# Patient Record
Sex: FEMALE | Race: BLACK OR AFRICAN AMERICAN | NOT HISPANIC OR LATINO
[De-identification: names, ages, dates, MRNs, and addresses within clinical notes are randomized per-mention and may not be internally consistent; named-entity substitution may affect disease eponyms.]

---

## 2018-08-13 ENCOUNTER — RESULT REVIEW (OUTPATIENT)
Age: 38
End: 2018-08-13

## 2019-01-10 PROBLEM — Z00.00 ENCOUNTER FOR PREVENTIVE HEALTH EXAMINATION: Status: ACTIVE | Noted: 2019-01-10

## 2019-01-18 ENCOUNTER — RESULT REVIEW (OUTPATIENT)
Age: 39
End: 2019-01-18

## 2019-01-18 ENCOUNTER — APPOINTMENT (OUTPATIENT)
Dept: MAMMOGRAPHY | Facility: CLINIC | Age: 39
End: 2019-01-18

## 2019-01-18 ENCOUNTER — APPOINTMENT (OUTPATIENT)
Dept: ULTRASOUND IMAGING | Facility: IMAGING CENTER | Age: 39
End: 2019-01-18
Payer: COMMERCIAL

## 2019-01-18 ENCOUNTER — OUTPATIENT (OUTPATIENT)
Dept: OUTPATIENT SERVICES | Facility: HOSPITAL | Age: 39
LOS: 1 days | End: 2019-01-18
Payer: COMMERCIAL

## 2019-01-18 ENCOUNTER — APPOINTMENT (OUTPATIENT)
Dept: MAMMOGRAPHY | Facility: IMAGING CENTER | Age: 39
End: 2019-01-18
Payer: COMMERCIAL

## 2019-01-18 DIAGNOSIS — R92.8 OTHER ABNORMAL AND INCONCLUSIVE FINDINGS ON DIAGNOSTIC IMAGING OF BREAST: ICD-10-CM

## 2019-01-18 PROCEDURE — G0279: CPT

## 2019-01-18 PROCEDURE — 19083 BX BREAST 1ST LESION US IMAG: CPT | Mod: LT

## 2019-01-18 PROCEDURE — 19082 BX BREAST ADD LESION STRTCTC: CPT | Mod: LT

## 2019-01-18 PROCEDURE — 19084 BX BREAST ADD LESION US IMAG: CPT

## 2019-01-18 PROCEDURE — 19082 BX BREAST ADD LESION STRTCTC: CPT

## 2019-01-18 PROCEDURE — 88360 TUMOR IMMUNOHISTOCHEM/MANUAL: CPT | Mod: 26

## 2019-01-18 PROCEDURE — 19081 BX BREAST 1ST LESION STRTCTC: CPT | Mod: LT

## 2019-01-18 PROCEDURE — 77065 DX MAMMO INCL CAD UNI: CPT | Mod: 26,RT

## 2019-01-18 PROCEDURE — 88305 TISSUE EXAM BY PATHOLOGIST: CPT | Mod: 26

## 2019-01-18 PROCEDURE — 19081 BX BREAST 1ST LESION STRTCTC: CPT

## 2019-01-18 PROCEDURE — 77065 DX MAMMO INCL CAD UNI: CPT | Mod: 26,LT

## 2019-01-18 PROCEDURE — 76642 ULTRASOUND BREAST LIMITED: CPT

## 2019-01-18 PROCEDURE — 88305 TISSUE EXAM BY PATHOLOGIST: CPT

## 2019-01-18 PROCEDURE — 77065 DX MAMMO INCL CAD UNI: CPT

## 2019-01-18 PROCEDURE — 76642 ULTRASOUND BREAST LIMITED: CPT | Mod: 26,RT

## 2019-01-18 PROCEDURE — 19084 BX BREAST ADD LESION US IMAG: CPT | Mod: LT

## 2019-01-18 PROCEDURE — A4648: CPT

## 2019-01-18 PROCEDURE — 88360 TUMOR IMMUNOHISTOCHEM/MANUAL: CPT

## 2019-01-18 PROCEDURE — G0279: CPT | Mod: 26

## 2019-01-18 PROCEDURE — 19083 BX BREAST 1ST LESION US IMAG: CPT

## 2019-01-31 ENCOUNTER — APPOINTMENT (OUTPATIENT)
Dept: SURGICAL ONCOLOGY | Facility: CLINIC | Age: 39
End: 2019-01-31
Payer: COMMERCIAL

## 2019-01-31 ENCOUNTER — TRANSCRIPTION ENCOUNTER (OUTPATIENT)
Age: 39
End: 2019-01-31

## 2019-01-31 VITALS
BODY MASS INDEX: 35.87 KG/M2 | HEART RATE: 82 BPM | OXYGEN SATURATION: 100 % | WEIGHT: 190 LBS | RESPIRATION RATE: 16 BRPM | HEIGHT: 61 IN | DIASTOLIC BLOOD PRESSURE: 72 MMHG | SYSTOLIC BLOOD PRESSURE: 119 MMHG

## 2019-01-31 DIAGNOSIS — Z78.9 OTHER SPECIFIED HEALTH STATUS: ICD-10-CM

## 2019-01-31 DIAGNOSIS — Z86.69 PERSONAL HISTORY OF OTHER DISEASES OF THE NERVOUS SYSTEM AND SENSE ORGANS: ICD-10-CM

## 2019-01-31 PROCEDURE — 99205 OFFICE O/P NEW HI 60 MIN: CPT

## 2019-01-31 PROCEDURE — 99000 SPECIMEN HANDLING OFFICE-LAB: CPT

## 2019-01-31 NOTE — CONSULT LETTER
[Dear  ___] : Dear  [unfilled], [Consult Letter:] : I had the pleasure of evaluating your patient, [unfilled]. [Please see my note below.] : Please see my note below. [Consult Closing:] : Thank you very much for allowing me to participate in the care of this patient.  If you have any questions, please do not hesitate to contact me. [Sincerely,] : Sincerely, [FreeTextEntry2] : Katya Platt MD [FreeTextEntry3] : Pranay Saenz MD \par (O) [phone]\par (F) [phone]

## 2019-01-31 NOTE — REASON FOR VISIT
[Initial Consultation] : an initial consultation for [Breast Cancer] : breast cancer [Other: _____] : [unfilled]

## 2019-01-31 NOTE — HISTORY OF PRESENT ILLNESS
[de-identified] : Ms. Altman is a 38 year old female who presents today for an initial consultation.  She  was referred by Dr. Katya Platt. \par \par Approximately 1 year ago the patient palpated a small lump in her Left outer breast.  She continued to monitor the area for change in size.  When she was seen by her physician for her annual exam she brought it to her attention and her physician was also able to palpate the questionable area.  In Aug 2018 she was sent for a bilateral mammo at which time she was noted with bilateral breast nodules.  She was noted with 3 nodules in the Left breast the largest in the upper posterior breast measuring approximately 3 cm.  The 2 smaller nodules were in the outer posterior breast measuring 1cm.  She was also noted with microcalcifications spanning approximately 13cm.  She underwent biopsies of Left breast 7:00, 5:00 and retroareolar region with the following results:\par \par Final pathology revealed:\par Retroareolar - DCIS (Er+/Pr+)\par 7:00 - DCIS (Er+/Pr+)\par 5:00 -  invasive moderately differentiated ductal carcinoma measuring at least 0.5cm (Er+/Pr+/Her2-)\par Left axilla - reactive LN\par \par On 19 she underwent a targeted Right breast mammo and was noted with a 3.3cm mass with internal calcifications in the Right posterior breast which was indeterminate and biopsy was recommended.  She is scheduled for this biopsy next week.\par Denies discharge or nipple inversion.\par \par PMH otherwise significant for hydrocephalus s/p shunt placement Aug 1990.\par Denies family history of breast or ovarian cancer:\par \par Menarche: 11 y.o.\par \par Age at first pregnancy: 18 y.o.\par \par Internist: Dr. Katya Platt

## 2019-01-31 NOTE — PHYSICAL EXAM
[Normal] : supple, no neck mass and thyroid not enlarged [Normal Supraclavicular Lymph Nodes] : normal supraclavicular lymph nodes [Normal Axillary Lymph Nodes] : normal axillary lymph nodes [Normal] : oriented to person, place and time, with appropriate affect [FreeTextEntry1] : RC present for exam.  [de-identified] : Normal S1, S2. Regular rate and rhythm. [de-identified] : Complete breast exam performed in supine and upright positions. Palpable superficial mass Left breast 3-4:00 without tenderness, nipple discharge, inversion, deviation or enlarged axillary or supraclavicular lymph nodes.  [de-identified] : Clear breath sounds bilaterally, normal respiratory effort.

## 2019-01-31 NOTE — ASSESSMENT
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 I explained that breast cancer can be treated with 2 main surgical approaches. One is breast conserving therapy. The other is mastectomy with or without immediate reconstruction by a plastic surgeon. Breast conserving therapy involves wide excision of the involved area. Negative margins must be achieved with this wide excision. In addition, evaluation of the lymph nodes either with a sentinel lymph node biopsy or an axillary lymph node dissection may be required. This treatment usually requires postoperative radiation to the breast. Treatment with mastectomy also involves node dissection. Postoperative radiation therapy may be needed even after mastectomy in certain advanced cases. \par \par At this time I am sending the patient for a bilateral beast MRI to evaluate for extent of disease, contralateral disease or suspicious lymph nodes. \par Being that she has no family history of breast or ovarian cancer and her  extent of Right breast cancer in office genetic testing will also be conducted today.  Will await results.\par \par Once results are obtained will discuss final surgical plan with patient however if testing negative and if patient wants breast conserving therapy can consider lumpectomy with bilateral reduction with plastics.

## 2019-02-06 ENCOUNTER — OUTPATIENT (OUTPATIENT)
Dept: OUTPATIENT SERVICES | Facility: HOSPITAL | Age: 39
LOS: 1 days | End: 2019-02-06
Payer: COMMERCIAL

## 2019-02-06 ENCOUNTER — APPOINTMENT (OUTPATIENT)
Dept: MRI IMAGING | Facility: IMAGING CENTER | Age: 39
End: 2019-02-06
Payer: COMMERCIAL

## 2019-02-06 DIAGNOSIS — D05.12 INTRADUCTAL CARCINOMA IN SITU OF LEFT BREAST: ICD-10-CM

## 2019-02-06 DIAGNOSIS — C50.912 MALIGNANT NEOPLASM OF UNSPECIFIED SITE OF LEFT FEMALE BREAST: ICD-10-CM

## 2019-02-06 PROCEDURE — 77049 MRI BREAST C-+ W/CAD BI: CPT | Mod: 26

## 2019-02-06 PROCEDURE — 70250 X-RAY EXAM OF SKULL: CPT

## 2019-02-06 PROCEDURE — C8937: CPT

## 2019-02-06 PROCEDURE — 70250 X-RAY EXAM OF SKULL: CPT | Mod: 26

## 2019-02-06 PROCEDURE — C8908: CPT

## 2019-02-06 PROCEDURE — A9585: CPT

## 2019-02-07 ENCOUNTER — APPOINTMENT (OUTPATIENT)
Dept: MAMMOGRAPHY | Facility: IMAGING CENTER | Age: 39
End: 2019-02-07
Payer: COMMERCIAL

## 2019-02-07 ENCOUNTER — OUTPATIENT (OUTPATIENT)
Dept: OUTPATIENT SERVICES | Facility: HOSPITAL | Age: 39
LOS: 1 days | End: 2019-02-07
Payer: COMMERCIAL

## 2019-02-07 ENCOUNTER — RESULT REVIEW (OUTPATIENT)
Age: 39
End: 2019-02-07

## 2019-02-07 DIAGNOSIS — Z00.8 ENCOUNTER FOR OTHER GENERAL EXAMINATION: ICD-10-CM

## 2019-02-07 PROCEDURE — 77065 DX MAMMO INCL CAD UNI: CPT

## 2019-02-07 PROCEDURE — 19081 BX BREAST 1ST LESION STRTCTC: CPT | Mod: RT

## 2019-02-07 PROCEDURE — A4648: CPT

## 2019-02-07 PROCEDURE — 88305 TISSUE EXAM BY PATHOLOGIST: CPT | Mod: 26

## 2019-02-07 PROCEDURE — 19081 BX BREAST 1ST LESION STRTCTC: CPT

## 2019-02-07 PROCEDURE — 88305 TISSUE EXAM BY PATHOLOGIST: CPT

## 2019-02-07 PROCEDURE — 77065 DX MAMMO INCL CAD UNI: CPT | Mod: 26,RT

## 2019-02-14 ENCOUNTER — APPOINTMENT (OUTPATIENT)
Dept: PLASTIC SURGERY | Facility: CLINIC | Age: 39
End: 2019-02-14
Payer: COMMERCIAL

## 2019-02-14 VITALS — HEIGHT: 61 IN | BODY MASS INDEX: 36.82 KG/M2 | WEIGHT: 195 LBS

## 2019-02-14 DIAGNOSIS — Z78.9 OTHER SPECIFIED HEALTH STATUS: ICD-10-CM

## 2019-02-14 PROCEDURE — 99205 OFFICE O/P NEW HI 60 MIN: CPT

## 2019-02-14 NOTE — REVIEW OF SYSTEMS
[Negative] : Heme/Lymph [Fever] : no fever [Chills] : no chills [Chest Pain] : no chest pain [Palpitations] : no palpitations [Shortness Of Breath] : no shortness of breath [Cough] : no cough [Abdominal Pain] : no abdominal pain [Vomiting] : no vomiting [Dysuria] : no dysuria

## 2019-02-14 NOTE — REASON FOR VISIT
[Consultation] : a consultation visit [Other: _____] : [unfilled] [FreeTextEntry1] : patient presents for breast reconstruction consultation.
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
## 2019-02-14 NOTE — PHYSICAL EXAM
[Bra Size: _______] : Bra Size: [unfilled] [NI] : Normal [de-identified] : sternal notch to nipple on the left is 36 cm and 35 cm on the right, the nipple to IMF is 19.5 cm on the left and 21 cm on the right, the base width is 14 cm, and there is grade 3 ptosis.  The left breast is lower than the right breast, the right breast is larger than the left breast. [de-identified] : 6 cm supraumbilical scar, 11 cm vertical infraumbilical scar, there is adequate adiposity for free flap tissue transfer

## 2019-02-21 ENCOUNTER — FORM ENCOUNTER (OUTPATIENT)
Age: 39
End: 2019-02-21

## 2019-02-22 ENCOUNTER — OUTPATIENT (OUTPATIENT)
Dept: OUTPATIENT SERVICES | Facility: HOSPITAL | Age: 39
LOS: 1 days | End: 2019-02-22
Payer: COMMERCIAL

## 2019-02-22 ENCOUNTER — APPOINTMENT (OUTPATIENT)
Age: 39
End: 2019-02-22
Payer: COMMERCIAL

## 2019-02-22 DIAGNOSIS — Z42.1 ENCOUNTER FOR BREAST RECONSTRUCTION FOLLOWING MASTECTOMY: ICD-10-CM

## 2019-02-22 PROCEDURE — 72198 MR ANGIO PELVIS W/O & W/DYE: CPT | Mod: 26

## 2019-02-22 PROCEDURE — 74185 MRA ABD W OR W/O CNTRST: CPT | Mod: 26

## 2019-02-22 PROCEDURE — C8920: CPT

## 2019-02-22 PROCEDURE — C8900: CPT

## 2019-02-25 ENCOUNTER — APPOINTMENT (OUTPATIENT)
Dept: SURGICAL ONCOLOGY | Facility: CLINIC | Age: 39
End: 2019-02-25
Payer: COMMERCIAL

## 2019-02-25 PROCEDURE — 99213 OFFICE O/P EST LOW 20 MIN: CPT

## 2019-02-25 NOTE — ASSESSMENT
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 I explained that breast cancer can be treated with 2 main surgical approaches. One is breast conserving therapy. The other is mastectomy with or without immediate reconstruction by a plastic surgeon. Breast conserving therapy involves wide excision of the involved area. Negative margins must be achieved with this wide excision. In addition, evaluation of the lymph nodes either with a sentinel lymph node biopsy or an axillary lymph node dissection may be required. This treatment usually requires postoperative radiation to the breast. Treatment with mastectomy also involves node dissection. Postoperative radiation therapy may be needed even after mastectomy in certain advanced c. has\par \par \par Once results are obtained will discuss final surgical plan with patient however if testing negative and if patient wants breast conserving therapy can consider lumpectomy with bilateral reduction with plastics.  \par \par Pt desires bilat. mast with immediate recon.

## 2019-02-25 NOTE — HISTORY OF PRESENT ILLNESS
[de-identified] : Ms. Altman is a 38 year old female who presents today for pre-surgical discussion. \par \par Approximately 1 year ago the patient palpated a small lump in her Left outer breast.  She continued to monitor the area for change in size.  When she was seen by her physician for her annual exam she brought it to her attention and her physician was also able to palpate the questionable area.  In Aug 2018 she was sent for a bilateral mammo at which time she was noted with bilateral breast nodules.  She was noted with 3 nodules in the Left breast the largest in the upper posterior breast measuring approximately 3 cm.  The 2 smaller nodules were in the outer posterior breast measuring 1cm.  She was also noted with microcalcifications spanning approximately 13cm.  She underwent biopsies of Left breast 7:00, 5:00 and retroareolar region with the following results:\par \par Final pathology revealed:\par Retroareolar - DCIS (Er+/Pr+)\par 7:00 - DCIS (Er+/Pr+)\par 5:00 -  invasive moderately differentiated ductal carcinoma measuring at least 0.5cm (Er+/Pr+/Her2-)\par Left axilla - reactive LN\par \par On 19 she underwent a targeted Right breast mammo and was noted with a 3.3cm mass with internal calcifications in the Right posterior breast which was indeterminate and biopsy was recommended.  Final path was benign.\par \par She has since consulted with Dr. aCn (plastics) regarding reconstruction options.\par \par PMH otherwise significant for hydrocephalus s/p shunt placement Aug 1990.\par Denies family history of breast or ovarian cancer:\par \par Menarche: 11 y.o.\par \par Age at first pregnancy: 18 y.o.\par \par Internist: Dr. Katya Platt

## 2019-03-06 ENCOUNTER — OUTPATIENT (OUTPATIENT)
Dept: OUTPATIENT SERVICES | Facility: HOSPITAL | Age: 39
LOS: 1 days | End: 2019-03-06

## 2019-03-06 VITALS
OXYGEN SATURATION: 98 % | WEIGHT: 203.93 LBS | HEIGHT: 60.25 IN | TEMPERATURE: 99 F | HEART RATE: 93 BPM | RESPIRATION RATE: 14 BRPM | SYSTOLIC BLOOD PRESSURE: 130 MMHG | DIASTOLIC BLOOD PRESSURE: 80 MMHG

## 2019-03-06 DIAGNOSIS — Z98.890 OTHER SPECIFIED POSTPROCEDURAL STATES: Chronic | ICD-10-CM

## 2019-03-06 DIAGNOSIS — Z98.2 PRESENCE OF CEREBROSPINAL FLUID DRAINAGE DEVICE: Chronic | ICD-10-CM

## 2019-03-06 DIAGNOSIS — Z42.1 ENCOUNTER FOR BREAST RECONSTRUCTION FOLLOWING MASTECTOMY: ICD-10-CM

## 2019-03-06 DIAGNOSIS — D05.12 INTRADUCTAL CARCINOMA IN SITU OF LEFT BREAST: ICD-10-CM

## 2019-03-06 LAB
ANION GAP SERPL CALC-SCNC: 8 MMO/L — SIGNIFICANT CHANGE UP (ref 7–14)
BLD GP AB SCN SERPL QL: NEGATIVE — SIGNIFICANT CHANGE UP
BUN SERPL-MCNC: 11 MG/DL — SIGNIFICANT CHANGE UP (ref 7–23)
CALCIUM SERPL-MCNC: 9.1 MG/DL — SIGNIFICANT CHANGE UP (ref 8.4–10.5)
CHLORIDE SERPL-SCNC: 102 MMOL/L — SIGNIFICANT CHANGE UP (ref 98–107)
CO2 SERPL-SCNC: 28 MMOL/L — SIGNIFICANT CHANGE UP (ref 22–31)
CREAT SERPL-MCNC: 0.76 MG/DL — SIGNIFICANT CHANGE UP (ref 0.5–1.3)
GLUCOSE SERPL-MCNC: 86 MG/DL — SIGNIFICANT CHANGE UP (ref 70–99)
HCT VFR BLD CALC: 37.8 % — SIGNIFICANT CHANGE UP (ref 34.5–45)
HGB BLD-MCNC: 11.8 G/DL — SIGNIFICANT CHANGE UP (ref 11.5–15.5)
MCHC RBC-ENTMCNC: 29.1 PG — SIGNIFICANT CHANGE UP (ref 27–34)
MCHC RBC-ENTMCNC: 31.2 % — LOW (ref 32–36)
MCV RBC AUTO: 93.3 FL — SIGNIFICANT CHANGE UP (ref 80–100)
NRBC # FLD: 0 K/UL — LOW (ref 25–125)
PLATELET # BLD AUTO: 228 K/UL — SIGNIFICANT CHANGE UP (ref 150–400)
PMV BLD: 13.3 FL — HIGH (ref 7–13)
POTASSIUM SERPL-MCNC: 4.4 MMOL/L — SIGNIFICANT CHANGE UP (ref 3.5–5.3)
POTASSIUM SERPL-SCNC: 4.4 MMOL/L — SIGNIFICANT CHANGE UP (ref 3.5–5.3)
RBC # BLD: 4.05 M/UL — SIGNIFICANT CHANGE UP (ref 3.8–5.2)
RBC # FLD: 12.8 % — SIGNIFICANT CHANGE UP (ref 10.3–14.5)
RH IG SCN BLD-IMP: POSITIVE — SIGNIFICANT CHANGE UP
SODIUM SERPL-SCNC: 138 MMOL/L — SIGNIFICANT CHANGE UP (ref 135–145)
WBC # BLD: 5.01 K/UL — SIGNIFICANT CHANGE UP (ref 3.8–10.5)
WBC # FLD AUTO: 5.01 K/UL — SIGNIFICANT CHANGE UP (ref 3.8–10.5)

## 2019-03-06 RX ORDER — SODIUM CHLORIDE 9 MG/ML
1000 INJECTION, SOLUTION INTRAVENOUS
Qty: 0 | Refills: 0 | Status: DISCONTINUED | OUTPATIENT
Start: 2019-03-20 | End: 2019-03-20

## 2019-03-06 NOTE — H&P PST ADULT - NEGATIVE CARDIOVASCULAR SYMPTOMS
no chest pain/no claudication/no palpitations/no orthopnea/no peripheral edema/no paroxysmal nocturnal dyspnea/no dyspnea on exertion
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
## 2019-03-06 NOTE — H&P PST ADULT - PROBLEM SELECTOR PLAN 1
Scheduled for Bilateral total mastectomy, bilateral sentinel lymph node biopsy , possible dissection, bilateral breast reconstruction with Deep Inferior Epigastric artery  flap on 03/20/19. Pre op instructions, famotidine, chlorhexidine gluconate soap given and explained. Pt verbalized understanding.

## 2019-03-06 NOTE — H&P PST ADULT - RS GEN PE MLT RESP DETAILS PC
no rhonchi/clear to auscultation bilaterally/good air movement/no intercostal retractions/respirations non-labored/airway patent/breath sounds equal/no chest wall tenderness/no wheezes/no rales/no subcutaneous emphysema

## 2019-03-06 NOTE — H&P PST ADULT - HISTORY OF PRESENT ILLNESS
39 y/o Black female felt a lump on left breast Spring 2018, S/P US/ mammo 08/2018. Eval by GYN S/P biopsy of left breast 01/18/2019. Pt was then referred to surgeon by GYN. 37 y/o Black female presents to PST for pre op evaluation stated that she felt a lump on left breast Spring 2018, S/P US/ mammo 08/2018. Eval by GYN S/P biopsy of left breast 01/18/2019. Pt was then referred to surgeon by GYN. Pre op diagnosis: Intraductal carcinoma in situ of left breast. Now scheduled for bilateral total mastectomy, bilateral sentinel lymph node biopsy, possible dissection, bilateral breast reconstruction with Deep Inferior Epigastric Artery  flap on 03/20/19

## 2019-03-06 NOTE — H&P PST ADULT - GASTROINTESTINAL DETAILS
nontender/no distention/bowel sounds normal/no rebound tenderness/no bruit/no guarding/soft/no masses palpable

## 2019-03-13 ENCOUNTER — RX RENEWAL (OUTPATIENT)
Age: 39
End: 2019-03-13

## 2019-03-14 PROBLEM — E66.9 OBESITY, UNSPECIFIED: Chronic | Status: ACTIVE | Noted: 2019-03-06

## 2019-03-14 PROBLEM — D05.12 INTRADUCTAL CARCINOMA IN SITU OF LEFT BREAST: Chronic | Status: ACTIVE | Noted: 2019-03-06

## 2019-03-14 PROBLEM — S09.90XA UNSPECIFIED INJURY OF HEAD, INITIAL ENCOUNTER: Chronic | Status: ACTIVE | Noted: 2019-03-06

## 2019-03-14 PROBLEM — C50.912 MALIGNANT NEOPLASM OF UNSPECIFIED SITE OF LEFT FEMALE BREAST: Chronic | Status: ACTIVE | Noted: 2019-03-06

## 2019-03-19 ENCOUNTER — TRANSCRIPTION ENCOUNTER (OUTPATIENT)
Age: 39
End: 2019-03-19
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
## 2019-03-20 ENCOUNTER — APPOINTMENT (OUTPATIENT)
Dept: NUCLEAR MEDICINE | Facility: HOSPITAL | Age: 39
End: 2019-03-20

## 2019-03-20 ENCOUNTER — INPATIENT (INPATIENT)
Facility: HOSPITAL | Age: 39
LOS: 3 days | Discharge: HOME HEALTH | End: 2019-03-24
Attending: PLASTIC SURGERY | Admitting: PLASTIC SURGERY
Payer: COMMERCIAL

## 2019-03-20 ENCOUNTER — RESULT REVIEW (OUTPATIENT)
Age: 39
End: 2019-03-20

## 2019-03-20 ENCOUNTER — APPOINTMENT (OUTPATIENT)
Dept: SURGICAL ONCOLOGY | Facility: HOSPITAL | Age: 39
End: 2019-03-20

## 2019-03-20 VITALS
WEIGHT: 203.93 LBS | HEART RATE: 72 BPM | OXYGEN SATURATION: 99 % | HEIGHT: 60.25 IN | DIASTOLIC BLOOD PRESSURE: 42 MMHG | TEMPERATURE: 98 F | SYSTOLIC BLOOD PRESSURE: 110 MMHG | RESPIRATION RATE: 16 BRPM

## 2019-03-20 DIAGNOSIS — Z42.1 ENCOUNTER FOR BREAST RECONSTRUCTION FOLLOWING MASTECTOMY: ICD-10-CM

## 2019-03-20 DIAGNOSIS — Z98.2 PRESENCE OF CEREBROSPINAL FLUID DRAINAGE DEVICE: Chronic | ICD-10-CM

## 2019-03-20 DIAGNOSIS — Z98.890 OTHER SPECIFIED POSTPROCEDURAL STATES: Chronic | ICD-10-CM

## 2019-03-20 LAB
HCG UR QL: NEGATIVE — SIGNIFICANT CHANGE UP
RH IG SCN BLD-IMP: POSITIVE — SIGNIFICANT CHANGE UP

## 2019-03-20 PROCEDURE — S2068: CPT | Mod: 62,LT

## 2019-03-20 PROCEDURE — 38530 BIOPSY/REMOVAL LYMPH NODES: CPT | Mod: LT

## 2019-03-20 PROCEDURE — 38525 BIOPSY/REMOVAL LYMPH NODES: CPT | Mod: 50

## 2019-03-20 PROCEDURE — 88305 TISSUE EXAM BY PATHOLOGIST: CPT | Mod: 26

## 2019-03-20 PROCEDURE — 15877 SUCTION LIPECTOMY TRUNK: CPT

## 2019-03-20 PROCEDURE — 19303 MAST SIMPLE COMPLETE: CPT | Mod: 50

## 2019-03-20 PROCEDURE — 38790 INJECT FOR LYMPHATIC X-RAY: CPT | Mod: 50

## 2019-03-20 PROCEDURE — 88307 TISSUE EXAM BY PATHOLOGIST: CPT | Mod: 26

## 2019-03-20 PROCEDURE — S2068: CPT | Mod: RT,62

## 2019-03-20 PROCEDURE — 88332 PATH CONSLTJ SURG EA ADD BLK: CPT | Mod: 26

## 2019-03-20 PROCEDURE — 88331 PATH CONSLTJ SURG 1 BLK 1SPC: CPT | Mod: 26

## 2019-03-20 PROCEDURE — 38530 BIOPSY/REMOVAL LYMPH NODES: CPT | Mod: RT

## 2019-03-20 RX ORDER — ONDANSETRON 8 MG/1
4 TABLET, FILM COATED ORAL EVERY 8 HOURS
Qty: 0 | Refills: 0 | Status: DISCONTINUED | OUTPATIENT
Start: 2019-03-20 | End: 2019-03-24

## 2019-03-20 RX ORDER — CEFAZOLIN SODIUM 1 G
2000 VIAL (EA) INJECTION EVERY 8 HOURS
Qty: 0 | Refills: 0 | Status: COMPLETED | OUTPATIENT
Start: 2019-03-20 | End: 2019-03-23

## 2019-03-20 RX ORDER — HEPARIN SODIUM 5000 [USP'U]/ML
5000 INJECTION INTRAVENOUS; SUBCUTANEOUS EVERY 8 HOURS
Qty: 0 | Refills: 0 | Status: DISCONTINUED | OUTPATIENT
Start: 2019-03-20 | End: 2019-03-24

## 2019-03-20 RX ORDER — ACETAMINOPHEN 500 MG
1000 TABLET ORAL ONCE
Qty: 0 | Refills: 0 | Status: COMPLETED | OUTPATIENT
Start: 2019-03-20 | End: 2019-03-20

## 2019-03-20 RX ORDER — SODIUM CHLORIDE 9 MG/ML
1000 INJECTION, SOLUTION INTRAVENOUS
Qty: 0 | Refills: 0 | Status: DISCONTINUED | OUTPATIENT
Start: 2019-03-20 | End: 2019-03-22

## 2019-03-20 RX ORDER — CEFAZOLIN SODIUM 1 G
2000 VIAL (EA) INJECTION EVERY 8 HOURS
Qty: 0 | Refills: 0 | Status: DISCONTINUED | OUTPATIENT
Start: 2019-03-20 | End: 2019-03-20

## 2019-03-20 RX ORDER — OXYCODONE HYDROCHLORIDE 5 MG/1
5 TABLET ORAL EVERY 8 HOURS
Qty: 0 | Refills: 0 | Status: DISCONTINUED | OUTPATIENT
Start: 2019-03-20 | End: 2019-03-22

## 2019-03-20 RX ORDER — OXYCODONE HYDROCHLORIDE 5 MG/1
10 TABLET ORAL EVERY 6 HOURS
Qty: 0 | Refills: 0 | Status: DISCONTINUED | OUTPATIENT
Start: 2019-03-20 | End: 2019-03-24

## 2019-03-20 RX ORDER — ACETAMINOPHEN 500 MG
1000 TABLET ORAL ONCE
Qty: 0 | Refills: 0 | Status: COMPLETED | OUTPATIENT
Start: 2019-03-21 | End: 2019-03-21

## 2019-03-20 RX ORDER — KETOROLAC TROMETHAMINE 30 MG/ML
15 SYRINGE (ML) INJECTION EVERY 6 HOURS
Qty: 0 | Refills: 0 | Status: DISCONTINUED | OUTPATIENT
Start: 2019-03-20 | End: 2019-03-21

## 2019-03-20 RX ADMIN — Medication 100 MILLIGRAM(S): at 22:10

## 2019-03-20 RX ADMIN — HEPARIN SODIUM 5000 UNIT(S): 5000 INJECTION INTRAVENOUS; SUBCUTANEOUS at 22:10

## 2019-03-20 RX ADMIN — SODIUM CHLORIDE 125 MILLILITER(S): 9 INJECTION, SOLUTION INTRAVENOUS at 19:00

## 2019-03-20 RX ADMIN — Medication 400 MILLIGRAM(S): at 23:49

## 2019-03-20 NOTE — PROGRESS NOTE ADULT - ASSESSMENT
37 y/o Black female presents to PST for pre op evaluation stated that she felt a lump on left breast Spring 2018, S/P US/ mammo 08/2018. Eval by GYN S/P biopsy of left breast 01/18/2019. Pt was then referred to surgeon by GYN. Pre op diagnosis: Intraductal carcinoma in situ of left breast. Now s/p bilateral mastectomy with sentinal lymph node biopsy on 3/20.    Plan  - Pain control: IV Tylenol, Tyoradol 15mg IV push q6h, Oxy IR 5mg PO q8h PRN, Oxy IR 10mg PO q6h PRN  - NPO except meds w/ IV fluids  - Monitor VETO drain outputs  - Monitor vioptics  - Monitor surigical sites for signs of bleeding or hematoma  - DVT ppx: subQ heparin

## 2019-03-20 NOTE — BRIEF OPERATIVE NOTE - OPERATION/FINDINGS
Technetium radiotracer injected into both breasts prior to procedure. Left mastectomy performed first, sentinel lymph node identified with gamma probe and sent for intraoperative frozen, negative. Right mastectomy performed next, with sentinel lymph node sent for frozen, negative.     Reconstruction per Plastics.

## 2019-03-20 NOTE — BRIEF OPERATIVE NOTE - NSICDXBRIEFPROCEDURE_GEN_ALL_CORE_FT
PROCEDURES:  Mastectomy, bilateral, with sentinel lymph node biopsy 20-Mar-2019 11:08:25  Niko Rader

## 2019-03-21 ENCOUNTER — TRANSCRIPTION ENCOUNTER (OUTPATIENT)
Age: 39
End: 2019-03-21

## 2019-03-21 LAB
ANION GAP SERPL CALC-SCNC: 10 MMO/L — SIGNIFICANT CHANGE UP (ref 7–14)
BUN SERPL-MCNC: 9 MG/DL — SIGNIFICANT CHANGE UP (ref 7–23)
CALCIUM SERPL-MCNC: 8.5 MG/DL — SIGNIFICANT CHANGE UP (ref 8.4–10.5)
CHLORIDE SERPL-SCNC: 104 MMOL/L — SIGNIFICANT CHANGE UP (ref 98–107)
CO2 SERPL-SCNC: 23 MMOL/L — SIGNIFICANT CHANGE UP (ref 22–31)
CREAT SERPL-MCNC: 0.88 MG/DL — SIGNIFICANT CHANGE UP (ref 0.5–1.3)
GLUCOSE SERPL-MCNC: 136 MG/DL — HIGH (ref 70–99)
HCT VFR BLD CALC: 18.6 % — CRITICAL LOW (ref 34.5–45)
HCT VFR BLD CALC: 30.7 % — LOW (ref 34.5–45)
HGB BLD-MCNC: 10 G/DL — LOW (ref 11.5–15.5)
HGB BLD-MCNC: 6.1 G/DL — CRITICAL LOW (ref 11.5–15.5)
MCHC RBC-ENTMCNC: 29.8 PG — SIGNIFICANT CHANGE UP (ref 27–34)
MCHC RBC-ENTMCNC: 29.8 PG — SIGNIFICANT CHANGE UP (ref 27–34)
MCHC RBC-ENTMCNC: 32.6 % — SIGNIFICANT CHANGE UP (ref 32–36)
MCHC RBC-ENTMCNC: 32.8 % — SIGNIFICANT CHANGE UP (ref 32–36)
MCV RBC AUTO: 90.7 FL — SIGNIFICANT CHANGE UP (ref 80–100)
MCV RBC AUTO: 91.4 FL — SIGNIFICANT CHANGE UP (ref 80–100)
NRBC # FLD: 0 K/UL — LOW (ref 25–125)
NRBC # FLD: 0 K/UL — LOW (ref 25–125)
PLATELET # BLD AUTO: 119 K/UL — LOW (ref 150–400)
PLATELET # BLD AUTO: 122 K/UL — LOW (ref 150–400)
PMV BLD: 13.2 FL — HIGH (ref 7–13)
PMV BLD: 14 FL — HIGH (ref 7–13)
POTASSIUM SERPL-MCNC: 4 MMOL/L — SIGNIFICANT CHANGE UP (ref 3.5–5.3)
POTASSIUM SERPL-SCNC: 4 MMOL/L — SIGNIFICANT CHANGE UP (ref 3.5–5.3)
RBC # BLD: 2.05 M/UL — LOW (ref 3.8–5.2)
RBC # BLD: 3.36 M/UL — LOW (ref 3.8–5.2)
RBC # FLD: 13.2 % — SIGNIFICANT CHANGE UP (ref 10.3–14.5)
RBC # FLD: 13.3 % — SIGNIFICANT CHANGE UP (ref 10.3–14.5)
SODIUM SERPL-SCNC: 137 MMOL/L — SIGNIFICANT CHANGE UP (ref 135–145)
WBC # BLD: 10.07 K/UL — SIGNIFICANT CHANGE UP (ref 3.8–10.5)
WBC # BLD: 12.29 K/UL — HIGH (ref 3.8–10.5)
WBC # FLD AUTO: 10.07 K/UL — SIGNIFICANT CHANGE UP (ref 3.8–10.5)
WBC # FLD AUTO: 12.29 K/UL — HIGH (ref 3.8–10.5)

## 2019-03-21 PROCEDURE — 71275 CT ANGIOGRAPHY CHEST: CPT | Mod: 26

## 2019-03-21 PROCEDURE — 93010 ELECTROCARDIOGRAM REPORT: CPT

## 2019-03-21 RX ORDER — SODIUM CHLORIDE 9 MG/ML
1000 INJECTION, SOLUTION INTRAVENOUS ONCE
Qty: 0 | Refills: 0 | Status: COMPLETED | OUTPATIENT
Start: 2019-03-21 | End: 2019-03-21

## 2019-03-21 RX ADMIN — OXYCODONE HYDROCHLORIDE 10 MILLIGRAM(S): 5 TABLET ORAL at 22:50

## 2019-03-21 RX ADMIN — HEPARIN SODIUM 5000 UNIT(S): 5000 INJECTION INTRAVENOUS; SUBCUTANEOUS at 06:30

## 2019-03-21 RX ADMIN — Medication 15 MILLIGRAM(S): at 06:30

## 2019-03-21 RX ADMIN — Medication 1000 MILLIGRAM(S): at 10:44

## 2019-03-21 RX ADMIN — Medication 400 MILLIGRAM(S): at 09:55

## 2019-03-21 RX ADMIN — Medication 100 MILLIGRAM(S): at 21:51

## 2019-03-21 RX ADMIN — Medication 100 MILLIGRAM(S): at 14:52

## 2019-03-21 RX ADMIN — Medication 15 MILLIGRAM(S): at 00:15

## 2019-03-21 RX ADMIN — OXYCODONE HYDROCHLORIDE 10 MILLIGRAM(S): 5 TABLET ORAL at 23:35

## 2019-03-21 RX ADMIN — Medication 15 MILLIGRAM(S): at 07:43

## 2019-03-21 RX ADMIN — OXYCODONE HYDROCHLORIDE 5 MILLIGRAM(S): 5 TABLET ORAL at 16:48

## 2019-03-21 RX ADMIN — Medication 1000 MILLIGRAM(S): at 00:08

## 2019-03-21 RX ADMIN — OXYCODONE HYDROCHLORIDE 5 MILLIGRAM(S): 5 TABLET ORAL at 17:18

## 2019-03-21 RX ADMIN — HEPARIN SODIUM 5000 UNIT(S): 5000 INJECTION INTRAVENOUS; SUBCUTANEOUS at 21:52

## 2019-03-21 RX ADMIN — HEPARIN SODIUM 5000 UNIT(S): 5000 INJECTION INTRAVENOUS; SUBCUTANEOUS at 14:52

## 2019-03-21 RX ADMIN — Medication 100 MILLIGRAM(S): at 06:00

## 2019-03-21 RX ADMIN — SODIUM CHLORIDE 75 MILLILITER(S): 9 INJECTION, SOLUTION INTRAVENOUS at 21:52

## 2019-03-21 RX ADMIN — SODIUM CHLORIDE 1000 MILLILITER(S): 9 INJECTION, SOLUTION INTRAVENOUS at 16:29

## 2019-03-21 RX ADMIN — Medication 15 MILLIGRAM(S): at 00:00

## 2019-03-21 NOTE — DISCHARGE NOTE PROVIDER - NSDCFUADDINST_GEN_ALL_CORE_FT
Activity:  - Rest at home during the first few days after surgery.  - Walking is encouraged, but strenuous exercise is not allowed until 6 weeks after surgery.   - Avoid strenuous activity. Do not lift your arms above your head. Do not lift more than 5-10 pounds.    Sleep:  Sleep on your back for the first two weeks after surgery.    Showering:  - You may take sponge baths following discharge. Pat dry. We will instruct you to shower once you have drains removed from your breasts in the office.  - Do not take a bath or submerge yourself in water.  - You will have special adhesive glue or tape over the incisions. Do not take these off.   For the Breast:  You have just undergone a breast reconstruction with the ALANA flap. Your breast will likely have bruising and possibly some blistering on the skin, which is expected after a mastectomy. You have a small patch of skin on your breasts, which is a different color than the surrounding breast skin. This paddle of skin comes from the abdomen and is an indicator of how the flap is doing. It is important to check this skin paddle daily. The skin should remain the same color. If the color of the small patch changes (i.e blue, purple, pale), please call the office immediately ([phone]).    For the Abdomen:  Your incision and belly button are covered in a special medical grade sealant, which will come off in the office, 2-3 weeks after surgery.    Drains:  Both the breast and abdomen will have drains. It is important to empty the drains twice daily and record the outputs. Please bring this sheet to your appointment after surgery. Based on the output, the drains will be removed 1 to 3 weeks after surgery. For the drains to be working appropriately, the bulbs need to be collapsed to create a light suction. The nurse in the hospital will review the drain care with you and your family prior to discharge home. It is best to safely secure the drains to your clothes with a safety pin.   In an effort to make you more comfortable with discharge home and to answer any questions you may have, I have prepared an instructional sheet for you. However, you may call the office at [phone] at any time with additional questions or concerns.    Call the Office:  Do not hesitate to call the office with any concerns or questions. A doctor is available to answer your questions 24 hours a day.   Please notify us immediately at [phone] if:  1. You have increased swelling, pain, or color change in the breast.  2. One breast becomes suddenly significantly larger than the other breast.  3. You have a sudden increase in swelling of the abdomen.  4. You have redness develop around the incisions.  5. You have a fever greater than 101 F.  6. You develop sudden increase in pain.  7. You develop drainage, spreading redness or foul odor  8. You have any questions. FOLLOW-UP:  -   Please call [phone] to schedule a follow-up appointment with your BREAST surgeon,  Dr. Saenz in 1 week.    Activity:  - Rest at home during the first few days after surgery.  - Walking is encouraged, but strenuous exercise is not allowed until 6 weeks after surgery.   - Avoid strenuous activity. Do not lift your arms above your head. Do not lift more than 5-10 pounds.    Sleep:  Sleep on your back for the first two weeks after surgery.    Showering:  - You may take sponge baths following discharge. Pat dry. We will instruct you to shower once you have drains removed from your breasts in the office.  - Do not take a bath or submerge yourself in water.  - You will have special adhesive glue or tape over the incisions. Do not take these off.   For the Breast:  You have just undergone a breast reconstruction with the ALANA flap. Your breast will likely have bruising and possibly some blistering on the skin, which is expected after a mastectomy. You have a small patch of skin on your breasts, which is a different color than the surrounding breast skin. This paddle of skin comes from the abdomen and is an indicator of how the flap is doing. It is important to check this skin paddle daily. The skin should remain the same color. If the color of the small patch changes (i.e blue, purple, pale), please call the office immediately ([phone]).    For the Abdomen:  Your incision and belly button are covered in a special medical grade sealant, which will come off in the office, 2-3 weeks after surgery.    Drains:  Both the breast and abdomen will have drains. It is important to empty the drains twice daily and record the outputs. Please bring this sheet to your appointment after surgery. Based on the output, the drains will be removed 1 to 3 weeks after surgery. For the drains to be working appropriately, the bulbs need to be collapsed to create a light suction. The nurse in the hospital will review the drain care with you and your family prior to discharge home. It is best to safely secure the drains to your clothes with a safety pin.   In an effort to make you more comfortable with discharge home and to answer any questions you may have, I have prepared an instructional sheet for you. However, you may call the office at [phone] at any time with additional questions or concerns.    Call the Office:  Do not hesitate to call the office with any concerns or questions. A doctor is available to answer your questions 24 hours a day.   Please notify us immediately at [phone] if:  1. You have increased swelling, pain, or color change in the breast.  2. One breast becomes suddenly significantly larger than the other breast.  3. You have a sudden increase in swelling of the abdomen.  4. You have redness develop around the incisions.  5. You have a fever greater than 101 F.  6. You develop sudden increase in pain.  7. You develop drainage, spreading redness or foul odor  8. You have any questions.

## 2019-03-21 NOTE — PROVIDER CONTACT NOTE (OTHER) - ASSESSMENT
Pt out of bed to chair with assist. Vital signs stable, No stO2 reading on Vioptix. B/L flap no swelling, no change in color or Temp at time.

## 2019-03-21 NOTE — DISCHARGE NOTE PROVIDER - CARE PROVIDER_API CALL
Pranay Saenz)  Surgery  [address], [place] [id number]  Phone: [phone]  Fax: [phone]  Follow Up Time:     Froylan Can; NELLY)  Otolaryngology; Plastic Surgery  [address] [place] [address] [address]  [place] [address]  Phone: [phone]  Fax: [phone]  Follow Up Time:

## 2019-03-21 NOTE — DISCHARGE NOTE PROVIDER - NSDCCPCAREPLAN_GEN_ALL_CORE_FT
PRINCIPAL DISCHARGE DIAGNOSIS  Diagnosis: Intraductal carcinoma in situ of left breast  Assessment and Plan of Treatment: Intraductal carcinoma in situ of left breast

## 2019-03-21 NOTE — CHART NOTE - NSCHARTNOTEFT_GEN_A_CORE
Pt tachycardic since 07:45 today. -124. BP stable around 120/50. Good UOP, 855. Saturating well on nasal cannula. Denies SOB. Denies calf pain. Reports chest pain especially with deep breathing that is reproducible on palpation of rib surgical sites. ECG demonstrating sinus tachycardia, . Pain is 5-6/10. Administering PRN oxycodone pain medication and will reassess HR.    Exam stable. ViOptix repositioned because of poor signal, new numbers reading R 93 with 89% signal quality and L 64 with 95% signal quality. Flaps warm, soft, strong Doppler signal. No collections appreciated on exam. VETO drains serosanguinous with < 30 mL output thus far today.    Plastic Surgery (pg [facility]: [id number], [facility]: [phone])

## 2019-03-21 NOTE — DISCHARGE NOTE PROVIDER - NSDCCPTREATMENT_GEN_ALL_CORE_FT
PRINCIPAL PROCEDURE  Procedure: Mastectomy, bilateral, with sentinel lymph node biopsy  Findings and Treatment:

## 2019-03-21 NOTE — PROGRESS NOTE ADULT - ASSESSMENT
Assessment: 38 year old woman s/p b/l mastectomy, b/lk SLNBx, b/l breast reconstruction with ALANA flap on 3/20, doing well postoperatively.    Plan:  >> Transfer to surgical floor.  >> Upon transfer to surgical floor, de-escalate flap checks to q2H.  >> Continue Vioptix.  >> Continue antibiotics for total x48 hours.  >> Continue DVT prophylaxis (SQH and SCDs).  >> Continue standing IV Toradol and IV Tylenol.  >> Continue LR @ 75 mL/hr.   >> Advance diet to regular.  >> Reduce O2 by nasal cannula to 2 LPM.  >> Incentive spirometry.  >> HOB at 30 degrees; Bed flexed at hips.  >> Out of bed to chair with assistance.  >> Plan to remove Puga catheter once out of bed. Assessment: 38 year old woman s/p b/l mastectomy, b/lk SLNBx, b/l breast reconstruction with ALANA flap on 3/20.  H/H this AM noted to be 6.1/18.6.      Plan:  >> 2u pRBCs in PACU  >> Transfer to surgical floor after blood given  >> FU post-transfusion CBC  >> Upon transfer to surgical floor, de-escalate flap checks to q2H.  >> Continue Vioptix.  >> Continue antibiotics for total x48 hours.  >> Continue DVT prophylaxis (SQH and SCDs).  >> Continue standing IV Toradol and IV Tylenol.  >> Continue LR @ 75 mL/hr.   >> Advance diet to regular.  >> Reduce O2 by nasal cannula to 2 LPM.  >> Incentive spirometry.  >> HOB at 30 degrees; Bed flexed at hips.  >> Out of bed to chair with assistance.  >> Plan to remove Puga catheter once out of bed.

## 2019-03-21 NOTE — DISCHARGE NOTE PROVIDER - HOSPITAL COURSE
The patient underwent bilateral simple mastectomies and bilateral deep inferior epigastric  flap breast reconstruction with Dr. Can in the OR. The patient tolerated the procedure well. 6 drains were placed: 2 in each breast and 2 in the abdomen. Postoperatively the patient was sent to the PACU. The patient remained in the PACU overnight. The patient's flaps were monitored by Vioptix and by clinical examination. On POD 1, the patient was hemodynamically stable, though she was noted to be anemic with Hgb 6.1, so she was given 2 units of pRBCs in the PACU.  Her post transfusion CBc demonstrated adequate response so she was transferred to a surgical floor; was advanced to a regular diet; was placed on her home medications; was out of bed to a chair and ambulating, and the patient's Puga catheter was removed. On POD 2, the Vioptix monitors were removed. During the patient's hospital course, the patient's pain was controlled by IV pain medications and then by PO pain medications.        At the time of discharge, the patient was hemodynamically stable, was tolerating PO diet, was voiding urine and passing stool, was ambulating, and was comfortable with adequate pain control. The patient was instructed to follow up with Dr. Can within x1 week(s) after discharge from the hospital and Dr. Saenz within x1 week(s) after discharge from the hospital. The patient felt comfortable with discharge. The patient was discharged with a prescription for oral pain medication. The patient had no other issues. The patient underwent bilateral simple mastectomies and bilateral deep inferior epigastric  flap breast reconstruction with Dr. Can in the OR. The patient tolerated the procedure well. 6 drains were placed: 2 in each breast and 2 in the abdomen. Postoperatively the patient was sent to the PACU. The patient remained in the PACU overnight. The patient's flaps were monitored by Vioptix and by clinical examination. On POD 1, the patient was hemodynamically stable, though she was noted to be anemic with Hgb 6.1, so she was given 2 units of pRBCs in the PACU.  Her post transfusion CBc demonstrated adequate response so she was transferred to a surgical floor; was advanced to a regular diet; was placed on her home medications; was out of bed to a chair and ambulating, and the patient's Puga catheter was removed. On POD 2, the Vioptix monitors were removed. During the patient's hospital course, the patient's pain was controlled by IV pain medications and then by PO pain medications.        At the time of discharge, the patient was hemodynamically stable, was tolerating PO diet, was voiding urine and passing stool, was ambulating, and was comfortable with adequate pain control. The patient was instructed to follow up with Dr. Can within x1 week(s) after discharge from the hospital and Dr. Saenz within x1 week(s) after discharge from the hospital. The patient felt comfortable with discharge. The patient received a prescription for pain medications preoperatively. The patient had no other issues. The patient underwent bilateral simple mastectomies and bilateral deep inferior epigastric  flap breast reconstruction with Dr. Can in the OR. The patient tolerated the procedure well. 6 drains were placed: 2 in each breast and 2 in the abdomen. Postoperatively the patient was sent to the PACU. The patient remained in the PACU overnight. The patient's flaps were monitored by Vioptix and by clinical examination. On POD 1, the patient was hemodynamically stable, though she was noted to be anemic with Hgb 6.1, so she was given 2 units of pRBCs in the PACU.  Her post transfusion CBc demonstrated adequate response so she was transferred to a surgical floor; was advanced to a regular diet; was placed on her home medications; was out of bed to a chair and ambulating, and the patient's Puga catheter was removed. On POD 2, the Vioptix monitors were removed. During the patient's hospital course, the patient's pain was controlled by IV pain medications and then by PO pain medications.        At the time of discharge, the patient was hemodynamically stable, was tolerating PO diet, was voiding urine and passing stool, was ambulating, and was comfortable with adequate pain control. The patient was instructed to follow up with Dr. Can within x1 week(s) after discharge from the hospital and Dr. Saenz within 1 week after discharge from the hospital. The patient felt comfortable with discharge. The patient received a prescription for pain medications preoperatively. The patient had no other issues.

## 2019-03-21 NOTE — DISCHARGE NOTE PROVIDER - CARE PROVIDERS DIRECT ADDRESSES
[email or web address]

## 2019-03-22 ENCOUNTER — TRANSCRIPTION ENCOUNTER (OUTPATIENT)
Age: 39
End: 2019-03-22

## 2019-03-22 LAB
ANION GAP SERPL CALC-SCNC: 8 MMO/L — SIGNIFICANT CHANGE UP (ref 7–14)
BUN SERPL-MCNC: 5 MG/DL — LOW (ref 7–23)
CALCIUM SERPL-MCNC: 8.4 MG/DL — SIGNIFICANT CHANGE UP (ref 8.4–10.5)
CHLORIDE SERPL-SCNC: 102 MMOL/L — SIGNIFICANT CHANGE UP (ref 98–107)
CO2 SERPL-SCNC: 27 MMOL/L — SIGNIFICANT CHANGE UP (ref 22–31)
CREAT SERPL-MCNC: 0.71 MG/DL — SIGNIFICANT CHANGE UP (ref 0.5–1.3)
GLUCOSE SERPL-MCNC: 149 MG/DL — HIGH (ref 70–99)
HCT VFR BLD CALC: 26.4 % — LOW (ref 34.5–45)
HGB BLD-MCNC: 8.6 G/DL — LOW (ref 11.5–15.5)
MAGNESIUM SERPL-MCNC: 2 MG/DL — SIGNIFICANT CHANGE UP (ref 1.6–2.6)
MCHC RBC-ENTMCNC: 29.5 PG — SIGNIFICANT CHANGE UP (ref 27–34)
MCHC RBC-ENTMCNC: 32.6 % — SIGNIFICANT CHANGE UP (ref 32–36)
MCV RBC AUTO: 90.4 FL — SIGNIFICANT CHANGE UP (ref 80–100)
NRBC # FLD: 0 K/UL — LOW (ref 25–125)
PHOSPHATE SERPL-MCNC: 1.5 MG/DL — LOW (ref 2.5–4.5)
PLATELET # BLD AUTO: 119 K/UL — LOW (ref 150–400)
PMV BLD: 12.6 FL — SIGNIFICANT CHANGE UP (ref 7–13)
POTASSIUM SERPL-MCNC: 4.1 MMOL/L — SIGNIFICANT CHANGE UP (ref 3.5–5.3)
POTASSIUM SERPL-SCNC: 4.1 MMOL/L — SIGNIFICANT CHANGE UP (ref 3.5–5.3)
RBC # BLD: 2.92 M/UL — LOW (ref 3.8–5.2)
RBC # FLD: 14.6 % — HIGH (ref 10.3–14.5)
SODIUM SERPL-SCNC: 137 MMOL/L — SIGNIFICANT CHANGE UP (ref 135–145)
WBC # BLD: 12.18 K/UL — HIGH (ref 3.8–10.5)
WBC # FLD AUTO: 12.18 K/UL — HIGH (ref 3.8–10.5)

## 2019-03-22 PROCEDURE — 99222 1ST HOSP IP/OBS MODERATE 55: CPT

## 2019-03-22 PROCEDURE — 93010 ELECTROCARDIOGRAM REPORT: CPT

## 2019-03-22 RX ORDER — DOCUSATE SODIUM 100 MG
100 CAPSULE ORAL
Qty: 0 | Refills: 0 | Status: DISCONTINUED | OUTPATIENT
Start: 2019-03-22 | End: 2019-03-24

## 2019-03-22 RX ORDER — SODIUM,POTASSIUM PHOSPHATES 278-250MG
2 POWDER IN PACKET (EA) ORAL
Qty: 0 | Refills: 0 | Status: DISCONTINUED | OUTPATIENT
Start: 2019-03-22 | End: 2019-03-22

## 2019-03-22 RX ORDER — OXYCODONE HYDROCHLORIDE 5 MG/1
5 TABLET ORAL EVERY 6 HOURS
Qty: 0 | Refills: 0 | Status: DISCONTINUED | OUTPATIENT
Start: 2019-03-22 | End: 2019-03-24

## 2019-03-22 RX ORDER — SENNA PLUS 8.6 MG/1
2 TABLET ORAL AT BEDTIME
Qty: 0 | Refills: 0 | Status: DISCONTINUED | OUTPATIENT
Start: 2019-03-22 | End: 2019-03-24

## 2019-03-22 RX ADMIN — HEPARIN SODIUM 5000 UNIT(S): 5000 INJECTION INTRAVENOUS; SUBCUTANEOUS at 05:52

## 2019-03-22 RX ADMIN — OXYCODONE HYDROCHLORIDE 10 MILLIGRAM(S): 5 TABLET ORAL at 06:40

## 2019-03-22 RX ADMIN — OXYCODONE HYDROCHLORIDE 10 MILLIGRAM(S): 5 TABLET ORAL at 05:52

## 2019-03-22 RX ADMIN — OXYCODONE HYDROCHLORIDE 5 MILLIGRAM(S): 5 TABLET ORAL at 11:12

## 2019-03-22 RX ADMIN — OXYCODONE HYDROCHLORIDE 10 MILLIGRAM(S): 5 TABLET ORAL at 23:04

## 2019-03-22 RX ADMIN — HEPARIN SODIUM 5000 UNIT(S): 5000 INJECTION INTRAVENOUS; SUBCUTANEOUS at 13:57

## 2019-03-22 RX ADMIN — Medication 100 MILLIGRAM(S): at 21:23

## 2019-03-22 RX ADMIN — Medication 85 MILLIMOLE(S): at 18:02

## 2019-03-22 RX ADMIN — Medication 100 MILLIGRAM(S): at 18:01

## 2019-03-22 RX ADMIN — Medication 100 MILLIGRAM(S): at 06:05

## 2019-03-22 RX ADMIN — OXYCODONE HYDROCHLORIDE 5 MILLIGRAM(S): 5 TABLET ORAL at 10:42

## 2019-03-22 RX ADMIN — HEPARIN SODIUM 5000 UNIT(S): 5000 INJECTION INTRAVENOUS; SUBCUTANEOUS at 21:23

## 2019-03-22 RX ADMIN — OXYCODONE HYDROCHLORIDE 10 MILLIGRAM(S): 5 TABLET ORAL at 16:23

## 2019-03-22 RX ADMIN — OXYCODONE HYDROCHLORIDE 10 MILLIGRAM(S): 5 TABLET ORAL at 22:34

## 2019-03-22 RX ADMIN — SENNA PLUS 2 TABLET(S): 8.6 TABLET ORAL at 21:23

## 2019-03-22 RX ADMIN — OXYCODONE HYDROCHLORIDE 10 MILLIGRAM(S): 5 TABLET ORAL at 16:53

## 2019-03-22 RX ADMIN — Medication 100 MILLIGRAM(S): at 13:57

## 2019-03-22 NOTE — DISCHARGE NOTE NURSING/CASE MANAGEMENT/SOCIAL WORK - NSDCPNDISPN_GEN_ALL_CORE
Education provided on the pain management plan of care/Activities of daily living, including home environment that might     exacerbate pain or reduce effectiveness of the pain management plan of care as well as strategies to address these issues/Side effects of pain management treatment/Safe use, storage and disposal of opioids when prescribed/Opioids not applicable/not prescribed

## 2019-03-22 NOTE — PROGRESS NOTE ADULT - ASSESSMENT
Assessment: 38 year old woman s/p b/l mastectomy, b/lk SLNBx, b/l breast reconstruction with ALANA flap on 3/20.        Plan:  >> Appreciate cardiology recs  >> flap checks via doppler q4h  >> FU AM labs  >> Continue antibiotics for total x48 hours  >> Continue DVT prophylaxis (SQH and SCDs)  >> Continue standing IV Toradol and IV Tylenol. Transition to PO Tylenol and PO Toradol  >> d/c IVF  >> Regular diet  >> d/c O2 by NC  >> Incentive spirometry  >> HOB at 30 degrees; Bed flexed at hips  >> Ambulate with assistance  >> Disposition planning

## 2019-03-22 NOTE — CONSULT NOTE ADULT - ATTENDING COMMENTS
Sinus tachycardia is not a primary cardiac dysrhythmia and generally stems from a secondary process. No evidence of ischemia or HF. No plans for further cardiac testing. Please call with questions.

## 2019-03-22 NOTE — PROGRESS NOTE ADULT - ASSESSMENT
39yo F s/p bilat mastectomy with bilat SLNBx (3/20), bilat [place] flap reconstruction by [facility]    PLAN:  -  c/w reg diet  -  wound care per [facility]  -  tachycardia improved post-txfn, neg CTA for PE, f/u H/H and HR  -  OOB to ambulate  -  pain control  -  monitor drain output  -  continue with excellent care per primary team    DISPO:  home when cleared by [facility]    D SURGERY  [id number]

## 2019-03-22 NOTE — DISCHARGE NOTE NURSING/CASE MANAGEMENT/SOCIAL WORK - NSDCDPATPORTLINK_GEN_ALL_CORE
You can access the [facility] Patient Portal, offered by [facility], by registering with the following website: [email or web address]
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
## 2019-03-23 LAB
HCT VFR BLD CALC: 31.2 % — LOW (ref 34.5–45)
HGB BLD-MCNC: 10.2 G/DL — LOW (ref 11.5–15.5)
MCHC RBC-ENTMCNC: 29.6 PG — SIGNIFICANT CHANGE UP (ref 27–34)
MCHC RBC-ENTMCNC: 32.7 % — SIGNIFICANT CHANGE UP (ref 32–36)
MCV RBC AUTO: 90.4 FL — SIGNIFICANT CHANGE UP (ref 80–100)
NRBC # FLD: 0 K/UL — LOW (ref 25–125)
PHOSPHATE SERPL-MCNC: 2.5 MG/DL — SIGNIFICANT CHANGE UP (ref 2.5–4.5)
PLATELET # BLD AUTO: 141 K/UL — LOW (ref 150–400)
PMV BLD: 12.4 FL — SIGNIFICANT CHANGE UP (ref 7–13)
RBC # BLD: 3.45 M/UL — LOW (ref 3.8–5.2)
RBC # FLD: 14.6 % — HIGH (ref 10.3–14.5)
WBC # BLD: 7.49 K/UL — SIGNIFICANT CHANGE UP (ref 3.8–10.5)
WBC # FLD AUTO: 7.49 K/UL — SIGNIFICANT CHANGE UP (ref 3.8–10.5)

## 2019-03-23 RX ADMIN — Medication 100 MILLIGRAM(S): at 06:56

## 2019-03-23 RX ADMIN — OXYCODONE HYDROCHLORIDE 10 MILLIGRAM(S): 5 TABLET ORAL at 17:22

## 2019-03-23 RX ADMIN — Medication 100 MILLIGRAM(S): at 14:24

## 2019-03-23 RX ADMIN — OXYCODONE HYDROCHLORIDE 10 MILLIGRAM(S): 5 TABLET ORAL at 11:41

## 2019-03-23 RX ADMIN — OXYCODONE HYDROCHLORIDE 10 MILLIGRAM(S): 5 TABLET ORAL at 23:57

## 2019-03-23 RX ADMIN — OXYCODONE HYDROCHLORIDE 10 MILLIGRAM(S): 5 TABLET ORAL at 11:11

## 2019-03-23 RX ADMIN — SENNA PLUS 2 TABLET(S): 8.6 TABLET ORAL at 23:04

## 2019-03-23 RX ADMIN — HEPARIN SODIUM 5000 UNIT(S): 5000 INJECTION INTRAVENOUS; SUBCUTANEOUS at 14:24

## 2019-03-23 RX ADMIN — OXYCODONE HYDROCHLORIDE 10 MILLIGRAM(S): 5 TABLET ORAL at 17:52

## 2019-03-23 RX ADMIN — HEPARIN SODIUM 5000 UNIT(S): 5000 INJECTION INTRAVENOUS; SUBCUTANEOUS at 23:03

## 2019-03-23 RX ADMIN — HEPARIN SODIUM 5000 UNIT(S): 5000 INJECTION INTRAVENOUS; SUBCUTANEOUS at 06:56

## 2019-03-23 RX ADMIN — Medication 100 MILLIGRAM(S): at 17:22

## 2019-03-23 NOTE — PROGRESS NOTE ADULT - ASSESSMENT
POD3 s/p bilateral mastectomy and immediate reconstruction with ALANA flaps.    - Ambulate  - Flap checks Q4  - D/c planning this evening versus tomorrow.

## 2019-03-24 VITALS
DIASTOLIC BLOOD PRESSURE: 56 MMHG | SYSTOLIC BLOOD PRESSURE: 128 MMHG | RESPIRATION RATE: 18 BRPM | OXYGEN SATURATION: 97 % | HEART RATE: 95 BPM | TEMPERATURE: 99 F

## 2019-03-24 RX ADMIN — Medication 100 MILLIGRAM(S): at 06:37

## 2019-03-24 RX ADMIN — OXYCODONE HYDROCHLORIDE 10 MILLIGRAM(S): 5 TABLET ORAL at 07:45

## 2019-03-24 RX ADMIN — OXYCODONE HYDROCHLORIDE 10 MILLIGRAM(S): 5 TABLET ORAL at 15:02

## 2019-03-24 RX ADMIN — HEPARIN SODIUM 5000 UNIT(S): 5000 INJECTION INTRAVENOUS; SUBCUTANEOUS at 06:37

## 2019-03-24 RX ADMIN — OXYCODONE HYDROCHLORIDE 10 MILLIGRAM(S): 5 TABLET ORAL at 00:55

## 2019-03-24 RX ADMIN — OXYCODONE HYDROCHLORIDE 10 MILLIGRAM(S): 5 TABLET ORAL at 08:15

## 2019-03-24 RX ADMIN — OXYCODONE HYDROCHLORIDE 10 MILLIGRAM(S): 5 TABLET ORAL at 14:32

## 2019-03-24 NOTE — PROGRESS NOTE ADULT - SUBJECTIVE AND OBJECTIVE BOX
Doing well. 1U PRBC for symptomatic surgical blood loss anemia.  Tachycardia improved after 1U PRBC. Feels well today.    Vital Signs Last 24 Hrs  T(C): 37.4 (23 Mar 2019 06:54), Max: 37.5 (23 Mar 2019 00:30)  T(F): 99.3 (23 Mar 2019 06:54), Max: 99.5 (23 Mar 2019 00:30)  HR: 108 (23 Mar 2019 06:54) (107 - 121)  BP: 131/47 (23 Mar 2019 06:54) (96/63 - 133/53)  BP(mean): --  RR: 17 (23 Mar 2019 06:54) (16 - 18)  SpO2: 96% (23 Mar 2019 06:54) (94% - 100%)    Mastectomy flaps viable. Incisions intact. ALANA flaps soft, warm, bilateral dopplerable arterial signals. JPs serosang.  Abdomen soft, no collections. Umbilicus viable. JPs serosang
POST ANESTHESIA EVALUATION    38y Female POSTOP DAY 1 S/P     MENTAL STATUS: Patient participation [x  ] Awake     [  ] Arousable     [  ] Sedated    AIRWAY PATENCY: [  x] Satisfactory  [  ] Other:     Vital Signs Last 24 Hrs  T(C): 37.1 (21 Mar 2019 10:00), Max: 37.6 (21 Mar 2019 06:00)  T(F): 98.8 (21 Mar 2019 10:00), Max: 99.6 (21 Mar 2019 06:00)  HR: 114 (21 Mar 2019 11:00) (81 - 123)  BP: 119/48 (21 Mar 2019 11:00) (102/32 - 134/46)  BP(mean): 65 (21 Mar 2019 11:00) (51 - 74)  RR: 22 (21 Mar 2019 11:00) (10 - 22)  SpO2: 98% (21 Mar 2019 11:00) (98% - 100%)  I&O's Summary    20 Mar 2019 07:01  -  21 Mar 2019 07:00  --------------------------------------------------------  IN: 1950 mL / OUT: 1328 mL / NET: 622 mL    21 Mar 2019 07:01  -  21 Mar 2019 11:12  --------------------------------------------------------  IN: 1260 mL / OUT: 420 mL / NET: 840 mL          NAUSEA/ VOMITTING:  [ x ] NONE  [  ] CONTROLLED [  ] OTHER     PAIN: [x  ] CONTROLLED WITH CURRENT REGIMEN  [  ] OTHER    [  x] NO APPARENT ANESTHESIA COMPLICATIONS      Comments:
Patient OOB and in chair. Still tachycardic. Pain only 4/10. CT shows some atelectasis and effusions. Hct 26 from 37 preop. c/o some fatigue.    afebrile  flaps viable  skin viable  abdomen CDI  umbilicus viable    Persistant tachycardia. Postoperative anemia.     Ambulate with assistance. If patient remains tachycardic would consider transfusing one more unit PRBC.    Plan discussed with Dr. Can, nursing and resident team.
Patient in bed. C/o soreness. Was transfused last night  vss/A but HR around 120 still.  JPs 2 and 5 with less output, others still with moderate serosang output.    flaps and breast skin viable  abdomen CDI    Persistent tachycardia  -check cbc  -ambulate  -d/c JPs 2 and 5  -incentive spirometry  -pain control  -may need another day.
SUBJECTIVE:  Patient seen and examined at bedside, doing well, no acute events overnight.  Pain is well-controlled, denies nausea/vomiting, chest pain, SOB, fevers/chills.     OBJECTIVE:     ** VITAL SIGNS / I&O's **    T(C): 37.4 (03-21-19 @ 07:00), Max: 37.6 (03-21-19 @ 06:00)  T(F): 99.3 (03-21-19 @ 07:00), Max: 99.6 (03-21-19 @ 06:00)  HR: 100 (03-21-19 @ 07:00) (81 - 116)  BP: 109/32 (03-21-19 @ 07:00) (105/36 - 134/46)  RR: 19 (03-21-19 @ 07:00) (10 - 20)  SpO2: 100% (03-21-19 @ 07:00) (100% - 100%)      20 Mar 2019 07:01  -  21 Mar 2019 07:00  --------------------------------------------------------  IN:    IV PiggyBack: 200 mL    lactated ringers.: 1750 mL  Total IN: 1950 mL    OUT:    Bulb: 60 mL    Bulb: 49 mL    Bulb: 97 mL    Bulb: 53 mL    Bulb: 56 mL    Bulb: 38 mL    Indwelling Catheter - Urethral: 975 mL  Total OUT: 1328 mL    Total NET: 622 mL      ** PHYSICAL EXAM **    -- CONSTITUTIONAL: AOx3. NAD.   -- RIGHT BREAST / FLAP: Mastectomy skin flap ecchymosis, stable. No collections. Flap soft and pink with 2-3 second capillary refill. Vioptix: 88%. Drains serosanguinous.  -- LEFT BREAST / FLAP: Mastectomy skin flap ecchymosis, stable. No collections. Flap soft and pink with 2-3 second capillary refill. Vioptix: 74%. Drains serosanguinous.  -- RESPIRATORY: No increased WOB   -- ABDOMEN: Soft. No collections. Incision intact. Umbilicus viable. Drains serosanguinous.    ** LABS**                 6.1    10.07  )----------(  122       ( 21 Mar 2019 06:18 )               18.6
SUBJECTIVE:  Patient seen and examined at bedside, doing well, no acute events overnight.  Pain is well-controlled, denies nausea/vomiting, chest pain, SOB, palpitations, fevers/chills.     OBJECTIVE:     ** VITAL SIGNS / I&O's **    T(C): 37.3 (03-24-19 @ 06:05), Max: 37.4 (03-23-19 @ 10:29)  T(F): 99.1 (03-24-19 @ 06:05), Max: 99.4 (03-23-19 @ 10:29)  HR: 108 (03-24-19 @ 06:05) (100 - 109)  BP: 120/70 (03-24-19 @ 06:05) (120/70 - 136/72)  RR: 16 (03-24-19 @ 06:05) (16 - 18)  SpO2: 98% (03-24-19 @ 06:05) (96% - 100%)      23 Mar 2019 07:01  -  24 Mar 2019 07:00  --------------------------------------------------------  IN:    Oral Fluid: 220 mL  Total IN: 220 mL    OUT:    Bulb: 105 mL    Bulb: 140 mL    Bulb: 137.5 mL    Bulb: 90 mL    Bulb: 15 mL    Bulb: 10 mL    Voided: 1450 mL  Total OUT: 1947.5 mL    Total NET: -1727.5 mL      ** PHYSICAL EXAM **    -- CONSTITUTIONAL: AOx3. NAD.   -- RIGHT BREAST / FLAP: Mastectomy skin flap ecchymosis, stable. No collections. Flap soft and pink with 2-3 second capillary refill. + doppler signal. Drain serosanguinous.  -- LEFT BREAST / FLAP: Mastectomy skin flap ecchymosis, stable. No collections. Flap soft and pink with 2-3 second capillary refill. + doppler signal. Drain serosanguinous.  -- RESPIRATORY: no increased WOB.   -- ABDOMEN: Soft. No collections. Incision intact. Umbilicus viable. Drains serosanguinous.
SUBJECTIVE:  Patient seen and examined at bedside, yesterday AM was noted to have Hct 19 so transfused with 2u pRBCs with good response to Hct 31.  Vioptix were inconsistently able to record signals so DC'd, checking doppler to assess flap viability.  Patient was tachycardic to 110s-120s for majority of the day, EKG showing sinus tachycardia.  CT angio chest obtained to r/o PE, prelim read negative.  Patient clinically stable, complaining of pain but well-controlled with medications, denies shortness of breath, fevers/chills.    OBJECTIVE:     ** VITAL SIGNS / I&O's **    T(C): 37.1 (03-22-19 @ 05:50), Max: 37.2 (03-21-19 @ 13:00)  T(F): 98.7 (03-22-19 @ 05:50), Max: 99 (03-21-19 @ 13:00)  HR: 113 (03-22-19 @ 05:50) (108 - 124)  BP: 113/55 (03-22-19 @ 05:50) (110/65 - 127/56)  RR: 17 (03-22-19 @ 05:50) (17 - 22)  SpO2: 95% (03-22-19 @ 05:50) (95% - 100%)      21 Mar 2019 07:01  -  22 Mar 2019 07:00  --------------------------------------------------------  IN:    IV PiggyBack: 200 mL    Lactated Ringers IV Bolus: 1000 mL    lactated ringers.: 1575 mL    Oral Fluid: 240 mL    Packed Red Blood Cells: 620 mL  Total IN: 3635 mL    OUT:    Bulb: 26 mL    Bulb: 78 mL    Bulb: 57.5 mL    Bulb: 131.5 mL    Bulb: 100 mL    Bulb: 94.5 mL    Indwelling Catheter - Urethral: 1655 mL    Voided: 700 mL  Total OUT: 2842.5 mL    Total NET: 792.5 mL      ** PHYSICAL EXAM **    -- CONSTITUTIONAL: AOx3. NAD.   -- RIGHT BREAST / FLAP: Mastectomy skin flap ecchymosis, stable. No collections. Flap soft and pink with 2-3 second capillary refill. +Doppler signal. Drainsx2 serosanguinous.  -- LEFT BREAST / FLAP: Mastectomy skin flap ecchymosis, stable. No collections. Flap soft and pink with 2-3 second capillary refill. +Doppler signal. Drainsx2 serosanguinous.  -- CV: tachycardic to HR 140s, regular  -- RESPIRATORY: No increased WOB on NC  -- ABDOMEN: Soft. No collections. Incision intact. Umbilicus viable. Drains serosanguinous.      ** LABS**                 8.6    12.18  )----------(  119       ( 22 Mar 2019 06:21 )               26.4
Surgery POST-OP CHECK NOTE:    Procedure: Bilateral mastectomy with sentinal lymph node biopsy    Subjective: Resting comfortably in PACU bed. Pain controlled. No N/V, CP, or SOB.    Vital Signs Last 24 Hrs  T(C): 36.7 (20 Mar 2019 19:15), Max: 36.7 (20 Mar 2019 06:14)  T(F): 98 (20 Mar 2019 19:15), Max: 98 (20 Mar 2019 06:14)  HR: 99 (20 Mar 2019 21:00) (72 - 116)  BP: 113/41 (20 Mar 2019 21:00) (110/42 - 134/46)  BP(mean): 59 (20 Mar 2019 21:00) (58 - 74)  RR: 17 (20 Mar 2019 21:00) (10 - 20)  SpO2: 100% (20 Mar 2019 21:00) (99% - 100%)  I&O's Summary    20 Mar 2019 07:01  -  20 Mar 2019 21:32  --------------------------------------------------------  IN: 500 mL / OUT: 669 mL / NET: -169 mL                 PHYSICAL EXAM:  Gen: NAD, well-developed  Neuro: AAOX3, PERRL, CNII-XII grossly intact  Chest: right flank w/ 2 VETO drains serosang, left flank w/ 2 VETO drains serosang, nipples w/ vioptics monitors  CV: Pulse regularly present  Pulm: normal respiratory effort  GI: abd soft, nondistended, low transverse incision w/ prineo, LLQ and RLQ VETO drains serosang  Ext: 2+ pulses throughout
Vital Signs Last 24 Hrs  T(C): 37.4 (21 Mar 2019 07:00), Max: 37.6 (21 Mar 2019 06:00)  T(F): 99.3 (21 Mar 2019 07:00), Max: 99.6 (21 Mar 2019 06:00)  HR: 100 (21 Mar 2019 07:00) (81 - 116)  BP: 109/32 (21 Mar 2019 07:00) (105/36 - 134/46)  BP(mean): 51 (21 Mar 2019 07:00) (51 - 74)  RR: 19 (21 Mar 2019 07:00) (10 - 20)  SpO2: 100% (21 Mar 2019 07:00) (100% - 100%)    I&O's Detail    20 Mar 2019 07:01  -  21 Mar 2019 07:00  --------------------------------------------------------  IN:    IV PiggyBack: 200 mL    lactated ringers.: 1750 mL  Total IN: 1950 mL    OUT:    Bulb: 60 mL    Bulb: 49 mL    Bulb: 97 mL    Bulb: 53 mL    Bulb: 56 mL    Bulb: 38 mL    Indwelling Catheter - Urethral: 975 mL  Total OUT: 1328 mL    Total NET: 622 mL      21 Mar 2019 07:01  -  21 Mar 2019 08:03  --------------------------------------------------------  IN:    lactated ringers.: 75 mL  Total IN: 75 mL    OUT:  Total OUT: 0 mL    Total NET: 75 mL                                6.1    10.07 )-----------( 122      ( 21 Mar 2019 06:18 )             18.6       03-21    137  |  104  |  9   ----------------------------<  136<H>  4.0   |  23  |  0.88    Ca    8.5      21 Mar 2019 06:18    Awake alert oriented  post op pain    PE  Flaps with some ecchymosis, no hematoma seroma    Impression;  Post op anemia. Agree with planned transfusions  Otherwise doing well            PLAN:  Monitor flaps , transfusion, analgesia
Vital Signs Last 24 Hrs  T(C): 37.4 (23 Mar 2019 06:54), Max: 37.5 (23 Mar 2019 00:30)  T(F): 99.3 (23 Mar 2019 06:54), Max: 99.5 (23 Mar 2019 00:30)  HR: 108 (23 Mar 2019 06:54) (107 - 121)  BP: 131/47 (23 Mar 2019 06:54) (96/63 - 133/53)  BP(mean): --  RR: 17 (23 Mar 2019 06:54) (16 - 18)  SpO2: 96% (23 Mar 2019 06:54) (94% - 100%)    I&O's Detail    22 Mar 2019 07:01  -  23 Mar 2019 07:00  --------------------------------------------------------  IN:    IV PiggyBack: 500 mL  Total IN: 500 mL    OUT:    Bulb: 97.5 mL    Bulb: 100 mL    Bulb: 100 mL    Bulb: 65.5 mL    Bulb: 22.5 mL    Bulb: 35.5 mL    Voided: 2100 mL  Total OUT: 2521 mL    Total NET: -2021 mL                                8.6    12.18 )-----------( 119      ( 22 Mar 2019 06:21 )             26.4       03-22    137  |  102  |  5<L>  ----------------------------<  149<H>  4.1   |  27  |  0.71    Ca    8.4      22 Mar 2019 10:14  Phos  2.5     03-23  Mg     2.0     03-22    Comfortable  Received transfusion yesteday    PE: flaps healthy  no hematoma seroma     Imp: s/p bilat mast with ALANA flap            PLAN:  ambulated  d/c planning
patient doing much better today  she denies any fatigue, cp, sob, fevers, calf pain, or chest palpitations  her heart rate is stable  she is afebrile    on exam, both breast flaps are soft and viable. there is no mastectomy skin flap ischemia  the abdomen is soft. all incisions are intact
[facility] SURGERY DAILY PROGRESS NOTE    SUBJECTIVE:  -  no issues overnight, feeling better after transfusion, still mild fatigue    OBJECTIVE:    Vital Signs Last 24 Hrs  T(C): 37.4 (22 Mar 2019 10:38), Max: 37.4 (22 Mar 2019 10:38)  T(F): 99.4 (22 Mar 2019 10:38), Max: 99.4 (22 Mar 2019 10:38)  HR: 121 (22 Mar 2019 10:38) (109 - 121)  BP: 96/63 (22 Mar 2019 10:38) (96/63 - 127/56)  BP(mean): --  RR: 18 (22 Mar 2019 10:38) (17 - 18)  SpO2: 100% (22 Mar 2019 10:38) (95% - 100%)    I&O's Detail    21 Mar 2019 07:01  -  22 Mar 2019 07:00  --------------------------------------------------------  IN:    IV PiggyBack: 200 mL    Lactated Ringers IV Bolus: 1000 mL    lactated ringers.: 1575 mL    Oral Fluid: 240 mL    Packed Red Blood Cells: 620 mL  Total IN: 3635 mL    OUT:    Bulb: 26 mL    Bulb: 78 mL    Bulb: 57.5 mL    Bulb: 131.5 mL    Bulb: 100 mL    Bulb: 94.5 mL    Indwelling Catheter - Urethral: 1655 mL    Voided: 700 mL  Total OUT: 2842.5 mL    Total NET: 792.5 mL      22 Mar 2019 07:01  -  22 Mar 2019 15:36  --------------------------------------------------------  IN:  Total IN: 0 mL    OUT:    Bulb: 27.5 mL    Bulb: 30 mL    Bulb: 25 mL    Bulb: 7.5 mL    Bulb: 27.5 mL    Bulb: 12.5 mL    Voided: 550 mL  Total OUT: 680 mL    Total NET: -680 mL        LABS:                        8.6    12.18 )-----------( 119      ( 22 Mar 2019 06:21 )             26.4     03-22    137  |  102  |  5<L>  ----------------------------<  149<H>  4.1   |  27  |  0.71    Ca    8.4      22 Mar 2019 10:14  Phos  1.5     03-22  Mg     2.0     03-22      EXAM:  Gen:       alert, in NAD  Chest:       bilat mastectomy incisions c/d/i, flaps intact, viable                  VETO x 6 with SS output, no nataly blood  Lungs:       unlabored breathing  CV:       regular rate, rhythm  Abd:       nondistended, nontender               charlotte incision c/d/i  Ext:        no edema

## 2019-03-24 NOTE — PROGRESS NOTE ADULT - ASSESSMENT
Assessment: 38 year old woman s/p b/l mastectomy, b/lk SLNBx, b/l breast reconstruction with ALANA flap on 3/20.       Plan:  - discharge home today, follow up within 1 week  - needs size 5 bra to take home  - sleep on back  - flap checks q4h

## 2019-03-24 NOTE — PROGRESS NOTE ADULT - ASSESSMENT
s/p bilateral ALANA, POD 4  plan:   discharge home  sleep on back  prescriptions given preoperatively  f/u Wed

## 2019-03-25 LAB — SURGICAL PATHOLOGY STUDY: SIGNIFICANT CHANGE UP

## 2019-03-28 ENCOUNTER — APPOINTMENT (OUTPATIENT)
Age: 39
End: 2019-03-28
Payer: COMMERCIAL

## 2019-03-28 PROCEDURE — 99024 POSTOP FOLLOW-UP VISIT: CPT

## 2019-03-28 NOTE — REASON FOR VISIT
[Post Op: _________] : a [unfilled] post op visit [Spouse] : spouse [FreeTextEntry1] : DOS 03/20/2019 s/p b/l breast reconstruction with ALANA. Pt is getting better with time; Drains in place.

## 2019-04-04 ENCOUNTER — APPOINTMENT (OUTPATIENT)
Dept: PLASTIC SURGERY | Facility: CLINIC | Age: 39
End: 2019-04-04
Payer: COMMERCIAL

## 2019-04-04 PROCEDURE — 99024 POSTOP FOLLOW-UP VISIT: CPT

## 2019-04-07 NOTE — REASON FOR VISIT
[Post Op: _________] : a [unfilled] post op visit [Spouse] : spouse [FreeTextEntry1] :  DOS 03/20/2019 s/p b/l breast reconstruction with ALANA flaps.

## 2019-04-10 ENCOUNTER — TRANSCRIPTION ENCOUNTER (OUTPATIENT)
Age: 39
End: 2019-04-10

## 2019-04-11 ENCOUNTER — APPOINTMENT (OUTPATIENT)
Dept: PLASTIC SURGERY | Facility: CLINIC | Age: 39
End: 2019-04-11
Payer: COMMERCIAL

## 2019-04-11 PROCEDURE — 99024 POSTOP FOLLOW-UP VISIT: CPT

## 2019-04-11 NOTE — REASON FOR VISIT
[Follow-Up: _____] : a [unfilled] follow-up visit [Spouse] : spouse [FreeTextEntry1] : DOS 03/20/2019 s/p b/l breast reconstruction with ALANA flaps.  Pt is doing well; no complaints or concerns. \par

## 2019-04-18 ENCOUNTER — APPOINTMENT (OUTPATIENT)
Dept: SURGICAL ONCOLOGY | Facility: CLINIC | Age: 39
End: 2019-04-18
Payer: COMMERCIAL

## 2019-04-18 VITALS
RESPIRATION RATE: 18 BRPM | SYSTOLIC BLOOD PRESSURE: 136 MMHG | TEMPERATURE: 97.9 F | BODY MASS INDEX: 35.87 KG/M2 | DIASTOLIC BLOOD PRESSURE: 84 MMHG | HEART RATE: 80 BPM | WEIGHT: 190 LBS | HEIGHT: 61 IN | OXYGEN SATURATION: 96 %

## 2019-04-18 DIAGNOSIS — C50.912 MALIGNANT NEOPLASM OF UNSPECIFIED SITE OF LEFT FEMALE BREAST: ICD-10-CM

## 2019-04-18 PROCEDURE — 99024 POSTOP FOLLOW-UP VISIT: CPT

## 2019-04-22 ENCOUNTER — APPOINTMENT (OUTPATIENT)
Dept: HEMATOLOGY ONCOLOGY | Facility: CLINIC | Age: 39
End: 2019-04-22
Payer: COMMERCIAL

## 2019-04-22 ENCOUNTER — OUTPATIENT (OUTPATIENT)
Dept: OUTPATIENT SERVICES | Facility: HOSPITAL | Age: 39
LOS: 1 days | Discharge: HOME | End: 2019-04-22

## 2019-04-22 VITALS
HEART RATE: 69 BPM | WEIGHT: 192.02 LBS | BODY MASS INDEX: 36.73 KG/M2 | DIASTOLIC BLOOD PRESSURE: 75 MMHG | HEIGHT: 60.71 IN | TEMPERATURE: 98.2 F | OXYGEN SATURATION: 97 % | RESPIRATION RATE: 18 BRPM | SYSTOLIC BLOOD PRESSURE: 109 MMHG

## 2019-04-22 DIAGNOSIS — Z98.890 OTHER SPECIFIED POSTPROCEDURAL STATES: Chronic | ICD-10-CM

## 2019-04-22 DIAGNOSIS — D05.12 INTRADUCTAL CARCINOMA IN SITU OF LEFT BREAST: ICD-10-CM

## 2019-04-22 DIAGNOSIS — Z98.2 PRESENCE OF CEREBROSPINAL FLUID DRAINAGE DEVICE: Chronic | ICD-10-CM

## 2019-04-22 DIAGNOSIS — C50.919 MALIGNANT NEOPLASM OF UNSPECIFIED SITE OF UNSPECIFIED FEMALE BREAST: ICD-10-CM

## 2019-04-22 PROCEDURE — 99205 OFFICE O/P NEW HI 60 MIN: CPT

## 2019-04-22 RX ORDER — ASPIRIN 81 MG/1
81 TABLET ORAL
Qty: 21 | Refills: 0 | Status: DISCONTINUED | COMMUNITY
Start: 2019-03-13 | End: 2019-04-22

## 2019-04-22 RX ORDER — TAMOXIFEN CITRATE 20 MG/1
20 TABLET, FILM COATED ORAL DAILY
Qty: 90 | Refills: 1 | Status: ACTIVE | COMMUNITY
Start: 2019-04-22 | End: 1900-01-01

## 2019-04-22 RX ORDER — OXYCODONE AND ACETAMINOPHEN 5; 325 MG/1; MG/1
5-325 TABLET ORAL
Qty: 25 | Refills: 0 | Status: DISCONTINUED | COMMUNITY
Start: 2019-03-13 | End: 2019-04-22

## 2019-04-22 NOTE — ASSESSMENT
[FreeTextEntry1] : In summary, Ms. CHRISTOPHER JESSICA is a 38 year old postmenopausal female with stage IA (T1b, N0, M0) ER positive, OH positive, HER-2/karen negative invasive ductal carcinoma of the left breast. She is status post bilateral mastectomies and left sentinel ALN bx. Oncotype DX is 15. \par \par I discussed the treatment for stage I breast cancer with the patient including the role of chemotherapy, radiation therapy and endocrine therapy. Oncotype DX score is low and therefore chemotherapy will have no additional benefit over endocrine therapy alone (TAILORx). She is not a candidate for adjuvant RT either. She will benefit from endocrine therapy to prevent systemic recurrence. I recommend tamoxifen 20 mg x 10 years. \par \par I discussed the side effects of tamoxifen including but not limited to hot flashes, mood changes, fluid retention, vaginal dryness. cataracts, thromboembolic events, stroke, cardiovascular side effects and the risk of endometrial cancer. \par \par I reviewed that tamoxifen may stop her periods or can cause irregular bleeding. She is done with child bearing. She doesn’t wish to discuss fertility preserving options or meet with Dr Torres. I reviewed teratogenic effects of tamoxifen. She will continue to use non hormonal  barrier contraception to prevent pregnancy. No OCP use. She will followup with Dr Platt every 12 months and report any unusual vaginal bleeding or spotting. I recommended her to see an ophthalmologist annually. Patient is a nonsmoker and has no cardiovascular risk factors. She has no personal or family history of coagulation disorders.  She will continue to follow with her primary care physician for evaluation and management of risk factors for stroke. I educated about signs and symptoms of DVT/PE. Patient will report if she develops acute onset chest pain shortness of breath, leg swelling or calf pain. She understands the thrombotic risk and will hold tamoxifen prior to surgery or prolonged travel.\par \par After understanding the risks and benefits of tamoxifen, patient has decided to start tamoxifen. I will see her back in 3 months or sooner if she develops any side effects.\par \par She had BRCA 1/2 testing. We will call [facility] to add an extended panel.\par \par SHe has post mastectomy pain. She is getting home PT. She will see Dr Laws for post mastectomy  pain. Gabapentin was discussed but she will try it after completing home PT\par \par The patient had plenty of time to ask questions and all of her questions were answered to her satisfaction. I gave her my office phone number and encouraged her to call with any questions or additional information. [Curative] : Goals of care discussed with patient: Curative

## 2019-04-22 NOTE — HISTORY OF PRESENT ILLNESS
[de-identified] : Ms. Altman is a 38 year old female who presents today for her initial post op evaluation.\par \par Approximately 1 year ago the patient palpated a small lump in her Left outer breast.  She continued to monitor the area for change in size.  When she was seen by her physician for her annual exam she brought it to her attention and her physician was also able to palpate the questionable area.  In Aug 2018 she was sent for a bilateral mammo at which time she was noted with bilateral breast nodules.  She was noted with 3 nodules in the Left breast the largest in the upper posterior breast measuring approximately 3 cm.  The 2 smaller nodules were in the outer posterior breast measuring 1cm.  She was also noted with microcalcifications spanning approximately 13cm.  She underwent biopsies of Left breast 7:00, 5:00 and retroareolar region with the following results:\par \par Final pathology revealed:\par Retroareolar - DCIS (Er+/Pr+)\par 7:00 - DCIS (Er+/Pr+)\par 5:00 -  invasive moderately differentiated ductal carcinoma measuring at least 0.5cm (Er+/Pr+/Her2-)\par Left axilla - reactive LN\par \par On 19 she underwent a targeted Right breast mammo and was noted with a 3.3cm mass with internal calcifications in the Right posterior breast which was indeterminate and biopsy was recommended.  Final path was benign.\par \par She is now s/p bilateral total mastectomy, bilateral axillary sentinel lymph node biopsy on 3/20/19 with reconstruction by Dr. Can. Final pathology:\par Left breast: 7mm invasive ductal carcinoma with 5 negative LNs\par Right breast: Fibroadenoma with 2 negative LNs\par \par Today she feels generally well and denies post op fevers.  Drains were removed by Dr. Can.  Currently undergoing in home PT which she feels is helping with her sternal tightness.\par \par PMH otherwise significant for hydrocephalus s/p shunt placement Aug 1990.\par Denies family history of breast or ovarian cancer:\par \par Menarche: 11 y.o.\par \par Age at first pregnancy: 18 y.o.\par \par Internist: Dr. Katya Platt

## 2019-04-22 NOTE — OB HISTORY
[Definite:  ___ (Date)] : the last menstrual period was [unfilled] [Regular Cycle Intervals] : periods have been regular [___] : Living: [unfilled]

## 2019-04-22 NOTE — REASON FOR VISIT
[Post-Op] : a post-op for [FreeTextEntry2] : s/p Bilateral total mastectomy, bilateral axillary sentinel lymph node biopsy on 3/20/19.

## 2019-04-22 NOTE — CONSULT LETTER
[DrSridhar  ___] : Dr. CAIN [Dear  ___] : Dear  [unfilled], [Consult Letter:] : I had the pleasure of evaluating your patient, [unfilled]. [Please see my note below.] : Please see my note below. [Consult Closing:] : Thank you very much for allowing me to participate in the care of this patient.  If you have any questions, please do not hesitate to contact me. [Sincerely,] : Sincerely, [FreeTextEntry3] : Farheen Pantoja MD\par Division of Medical Oncology and Hematology\par [facility]\par [employer] [facility]\par [place], [place]

## 2019-04-22 NOTE — ASSESSMENT
[FreeTextEntry1] : Impression:\par s/p bilateral total mastectomy, bilateral axillary sentinel lymph node biopsy on 3/20/19 with reconstruction by Dr. Can. \par Final pathology:\par Left breast: 7mm invasive ductal carcinoma with 5 negative LNs\par Right breast: Fibroadenoma with 2 negative LNs\par \par Plan:\par RTO in 6 months\par Referral to med/onc\par Continued follow up with Dr. Can\par Provided with information for Dr. Kellen Laws as well as [facility] outpatient PT locations.

## 2019-04-22 NOTE — PHYSICAL EXAM
[Normal] : well developed, well nourished, in no acute distress [de-identified] : s/p bilateral mastectomy with ALANA reconstruction.  Incisions healing well without signs of infection, hematoma or seroma. [FreeTextEntry1] : RC present for exam.

## 2019-04-22 NOTE — PHYSICAL EXAM
[Fully active, able to carry on all pre-disease performance without restriction] : Status 0 - Fully active, able to carry on all pre-disease performance without restriction [Normal] : grossly intact [de-identified] : bl mastectomies with autologous reconstruction, healing well.
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
## 2019-05-02 ENCOUNTER — APPOINTMENT (OUTPATIENT)
Dept: PLASTIC SURGERY | Facility: CLINIC | Age: 39
End: 2019-05-02
Payer: COMMERCIAL

## 2019-05-02 PROCEDURE — 99024 POSTOP FOLLOW-UP VISIT: CPT

## 2019-05-04 NOTE — REASON FOR VISIT
[Post Op: _________] : a [unfilled] post op visit [Family Member] : family member [FreeTextEntry1] : DOS 03/20/2019 s/p b/l breast reconstruction with ALANA flaps. Pt is doing well; no complaints or concerns.

## 2019-05-30 ENCOUNTER — APPOINTMENT (OUTPATIENT)
Dept: PLASTIC SURGERY | Facility: CLINIC | Age: 39
End: 2019-05-30
Payer: COMMERCIAL

## 2019-05-30 PROCEDURE — 99024 POSTOP FOLLOW-UP VISIT: CPT

## 2019-05-30 NOTE — HISTORY OF PRESENT ILLNESS
[FreeTextEntry1] : Patient is a 39 year old postmenopausal female with stage IA (T1b, N0, M0) ER positive, OH positive, HER-2/karen negative invasive ductal carcinoma of the left breast s/p b/l skin sparing mastectomy with reconstruction with ALANA flap on 3/20/2019. Pt is doing well; patient without complaints she states she likes her volume.

## 2019-07-01 ENCOUNTER — TRANSCRIPTION ENCOUNTER (OUTPATIENT)
Age: 39
End: 2019-07-01

## 2019-07-01 ENCOUNTER — OUTPATIENT (OUTPATIENT)
Dept: OUTPATIENT SERVICES | Facility: HOSPITAL | Age: 39
LOS: 1 days | End: 2019-07-01

## 2019-07-01 VITALS
SYSTOLIC BLOOD PRESSURE: 110 MMHG | TEMPERATURE: 98 F | HEIGHT: 60.05 IN | RESPIRATION RATE: 16 BRPM | WEIGHT: 194.01 LBS | HEART RATE: 72 BPM | DIASTOLIC BLOOD PRESSURE: 70 MMHG

## 2019-07-01 DIAGNOSIS — C50.919 MALIGNANT NEOPLASM OF UNSPECIFIED SITE OF UNSPECIFIED FEMALE BREAST: ICD-10-CM

## 2019-07-01 DIAGNOSIS — Z98.890 OTHER SPECIFIED POSTPROCEDURAL STATES: Chronic | ICD-10-CM

## 2019-07-01 DIAGNOSIS — N65.0 DEFORMITY OF RECONSTRUCTED BREAST: ICD-10-CM

## 2019-07-01 DIAGNOSIS — Z98.2 PRESENCE OF CEREBROSPINAL FLUID DRAINAGE DEVICE: Chronic | ICD-10-CM

## 2019-07-01 DIAGNOSIS — C50.919 MALIGNANT NEOPLASM OF UNSPECIFIED SITE OF UNSPECIFIED FEMALE BREAST: Chronic | ICD-10-CM

## 2019-07-01 DIAGNOSIS — Z90.13 ACQUIRED ABSENCE OF BILATERAL BREASTS AND NIPPLES: ICD-10-CM

## 2019-07-01 LAB
ALBUMIN SERPL ELPH-MCNC: 4 G/DL — SIGNIFICANT CHANGE UP (ref 3.3–5)
ALP SERPL-CCNC: 79 U/L — SIGNIFICANT CHANGE UP (ref 40–120)
ALT FLD-CCNC: 14 U/L — SIGNIFICANT CHANGE UP (ref 4–33)
ANION GAP SERPL CALC-SCNC: 9 MMO/L — SIGNIFICANT CHANGE UP (ref 7–14)
AST SERPL-CCNC: 19 U/L — SIGNIFICANT CHANGE UP (ref 4–32)
BILIRUB SERPL-MCNC: < 0.2 MG/DL — LOW (ref 0.2–1.2)
BLD GP AB SCN SERPL QL: NEGATIVE — SIGNIFICANT CHANGE UP
BUN SERPL-MCNC: 10 MG/DL — SIGNIFICANT CHANGE UP (ref 7–23)
CALCIUM SERPL-MCNC: 9.6 MG/DL — SIGNIFICANT CHANGE UP (ref 8.4–10.5)
CHLORIDE SERPL-SCNC: 104 MMOL/L — SIGNIFICANT CHANGE UP (ref 98–107)
CO2 SERPL-SCNC: 28 MMOL/L — SIGNIFICANT CHANGE UP (ref 22–31)
CREAT SERPL-MCNC: 0.81 MG/DL — SIGNIFICANT CHANGE UP (ref 0.5–1.3)
GLUCOSE SERPL-MCNC: 89 MG/DL — SIGNIFICANT CHANGE UP (ref 70–99)
HCT VFR BLD CALC: 37.2 % — SIGNIFICANT CHANGE UP (ref 34.5–45)
HGB BLD-MCNC: 12.1 G/DL — SIGNIFICANT CHANGE UP (ref 11.5–15.5)
MCHC RBC-ENTMCNC: 29.3 PG — SIGNIFICANT CHANGE UP (ref 27–34)
MCHC RBC-ENTMCNC: 32.5 % — SIGNIFICANT CHANGE UP (ref 32–36)
MCV RBC AUTO: 90.1 FL — SIGNIFICANT CHANGE UP (ref 80–100)
NRBC # FLD: 0 K/UL — SIGNIFICANT CHANGE UP (ref 0–0)
PLATELET # BLD AUTO: 229 K/UL — SIGNIFICANT CHANGE UP (ref 150–400)
PMV BLD: 12.5 FL — SIGNIFICANT CHANGE UP (ref 7–13)
POTASSIUM SERPL-MCNC: 4.1 MMOL/L — SIGNIFICANT CHANGE UP (ref 3.5–5.3)
POTASSIUM SERPL-SCNC: 4.1 MMOL/L — SIGNIFICANT CHANGE UP (ref 3.5–5.3)
PROT SERPL-MCNC: 7.3 G/DL — SIGNIFICANT CHANGE UP (ref 6–8.3)
RBC # BLD: 4.13 M/UL — SIGNIFICANT CHANGE UP (ref 3.8–5.2)
RBC # FLD: 12.9 % — SIGNIFICANT CHANGE UP (ref 10.3–14.5)
RH IG SCN BLD-IMP: POSITIVE — SIGNIFICANT CHANGE UP
SODIUM SERPL-SCNC: 141 MMOL/L — SIGNIFICANT CHANGE UP (ref 135–145)
WBC # BLD: 4.82 K/UL — SIGNIFICANT CHANGE UP (ref 3.8–10.5)
WBC # FLD AUTO: 4.82 K/UL — SIGNIFICANT CHANGE UP (ref 3.8–10.5)

## 2019-07-01 NOTE — H&P PST ADULT - NSICDXPROBLEM_GEN_ALL_CORE_FT
PROBLEM DIAGNOSES  Problem: Breast cancer  Assessment and Plan: PROBLEM DIAGNOSES  Problem: Breast cancer  Assessment and Plan: This is a  40 y/o female who is scheduled for revision of b/l breast reconstruction and abdominal donor site  trunk liposuction with breast fat grafting on 7-2-19  * Given pre op instructions and cleanser instructions with good teach back and patient verbalized understanding  * Notified OR booking via fax of  shunt

## 2019-07-01 NOTE — H&P PST ADULT - NSICDXPASTSURGICALHX_GEN_ALL_CORE_FT
PAST SURGICAL HISTORY:  Breast cancer b/l mastectomies with reconstruction in 2019    H/O induced  x 1    H/O left breast biopsy     History of brain shunt 1990

## 2019-07-01 NOTE — H&P PST ADULT - HISTORY OF PRESENT ILLNESS
This is a 38 y/o female who was diagnosed with left breast cancer with subsequent b/l mastectomies with reconstruction. Scheduled for revision of b/l breast reconstruction and abdominal donor site  trunk liposuction with breast fat grafting on 7-2-19
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
## 2019-07-02 ENCOUNTER — OUTPATIENT (OUTPATIENT)
Dept: OUTPATIENT SERVICES | Facility: HOSPITAL | Age: 39
LOS: 1 days | Discharge: HOME | End: 2019-07-02
Payer: COMMERCIAL

## 2019-07-02 VITALS
HEART RATE: 72 BPM | SYSTOLIC BLOOD PRESSURE: 120 MMHG | TEMPERATURE: 98 F | HEIGHT: 60.05 IN | OXYGEN SATURATION: 100 % | WEIGHT: 194.01 LBS | RESPIRATION RATE: 16 BRPM | DIASTOLIC BLOOD PRESSURE: 50 MMHG

## 2019-07-02 VITALS — HEART RATE: 86 BPM

## 2019-07-02 DIAGNOSIS — Z98.890 OTHER SPECIFIED POSTPROCEDURAL STATES: Chronic | ICD-10-CM

## 2019-07-02 DIAGNOSIS — Z90.13 ACQUIRED ABSENCE OF BILATERAL BREASTS AND NIPPLES: ICD-10-CM

## 2019-07-02 DIAGNOSIS — Z98.2 PRESENCE OF CEREBROSPINAL FLUID DRAINAGE DEVICE: Chronic | ICD-10-CM

## 2019-07-02 DIAGNOSIS — C50.919 MALIGNANT NEOPLASM OF UNSPECIFIED SITE OF UNSPECIFIED FEMALE BREAST: Chronic | ICD-10-CM

## 2019-07-02 PROCEDURE — 15734 MUSCLE-SKIN GRAFT TRUNK: CPT

## 2019-07-02 PROCEDURE — 15877 SUCTION LIPECTOMY TRUNK: CPT

## 2019-07-02 PROCEDURE — 22901 EXC ABDL TUM DEEP 5 CM/>: CPT

## 2019-07-02 PROCEDURE — 19366: CPT | Mod: LT

## 2019-07-02 RX ORDER — OXYCODONE HYDROCHLORIDE 5 MG/1
5 TABLET ORAL ONCE
Refills: 0 | Status: DISCONTINUED | OUTPATIENT
Start: 2019-07-02 | End: 2019-07-02

## 2019-07-02 RX ORDER — OXYCODONE AND ACETAMINOPHEN 5; 325 MG/1; MG/1
5-325 TABLET ORAL
Qty: 20 | Refills: 0 | Status: ACTIVE | COMMUNITY
Start: 2019-07-02 | End: 1900-01-01

## 2019-07-02 NOTE — ASU DISCHARGE PLAN (ADULT/PEDIATRIC) - PAIN MANAGEMENT
Prescriptions electronically submitted to pharmacy from doctor's office/Rx for Percocet sent to patient's pharmacy ([facility]).

## 2019-07-02 NOTE — ASU DISCHARGE PLAN (ADULT/PEDIATRIC) - CARE PROVIDER_API CALL
Froylan Can; NELLY)  Otolaryngology; Plastic Surgery  [address] [place] [address] [address]  [place] [address]  Phone: [phone]  Fax: [phone]  Follow Up Time:

## 2019-07-02 NOTE — ASU DISCHARGE PLAN (ADULT/PEDIATRIC) - CALL YOUR DOCTOR IF YOU HAVE ANY OF THE FOLLOWING:
Bleeding that does not stop/Swelling that gets worse/Pain not relieved by Medications/Fever greater than (need to indicate Fahrenheit or Celsius)

## 2019-07-05 PROBLEM — D64.9 ANEMIA, UNSPECIFIED: Chronic | Status: ACTIVE | Noted: 2019-07-01

## 2019-07-10 ENCOUNTER — APPOINTMENT (OUTPATIENT)
Dept: PLASTIC SURGERY | Facility: CLINIC | Age: 39
End: 2019-07-10
Payer: COMMERCIAL

## 2019-07-10 PROCEDURE — 99024 POSTOP FOLLOW-UP VISIT: CPT

## 2019-07-14 NOTE — REASON FOR VISIT
[Post Op: _________] : a [unfilled] post op visit [FreeTextEntry1] : DOS: 07/02/2019 s/p revision of bilateral breast reconstruction.

## 2019-07-18 ENCOUNTER — APPOINTMENT (OUTPATIENT)
Dept: PLASTIC SURGERY | Facility: CLINIC | Age: 39
End: 2019-07-18
Payer: COMMERCIAL

## 2019-07-18 PROCEDURE — 99024 POSTOP FOLLOW-UP VISIT: CPT

## 2019-07-18 NOTE — PACU DISCHARGE NOTE - NS MD DISCHARGE NOTE DISCHARGE
Floor Subjective:     Renata Mathews is a 54 y.o. female who complains of the feeling she may be coming down with a UTI. Patient denies any s/s at this time. Patient denies flank pain, nausea, vomiting, fever, abdominal pain, unusual vaginal discharge, unusual vaginal bleeding, urethral discharge, pelvic pain. Patient does not have a history of recurrent UTI. Patient does not have a history of pyelonephritis. There is no problem list on file for this patient. There are no active problems to display for this patient. Current Outpatient Medications   Medication Sig Dispense Refill    etanercept (ENBREL SC) by SubCUTAneous route every seven (7) days.  losartan (COZAAR) 50 mg tablet Take 50 mg by mouth daily. Indications: hypertension, patient states that she takes this with \"potassium\" in it      ALPRAZolam (XANAX) 0.5 mg tablet three (3) times daily as needed. 0    pantoprazole (PROTONIX) 40 mg tablet daily. 3    levothyroxine (SYNTHROID) 200 mcg tablet Take 200 mcg by mouth Daily (before breakfast).  zolpidem CR (AMBIEN CR) 6.25 mg tablet Take 6.25 mg by mouth nightly as needed.  methotrexate (RHEUMATREX) 2.5 mg tablet Take 2.5 mg by mouth Every Thursday.          No Known Allergies  Past Medical History:   Diagnosis Date    Arthritis     Endocrine disease     hypothyroid     Hypertension     Neurological disorder     headaches     Past Surgical History:   Procedure Laterality Date    ABDOMEN SURGERY PROC UNLISTED      lap, diagnostic for endometriosis    COLONOSCOPY N/A 2018    COLONOSCOPY performed by Antonieta Yadav MD at [facility]"" ENDOSCOPY    COLONOSCOPY N/A 2018    COLONOSCOPY performed by Antonieta Yadav MD at \[facility]"" ENDOSCOPY    HX ENDOSCOPY      HX GYN       and D&C    HX ORTHOPAEDIC      bone fusions    HX OTHER SURGICAL  2018    liver biopsy     Family History   Problem Relation Age of Onset    Colon Polyps Mother      Social History     Tobacco Use  Smoking status: Never Smoker    Smokeless tobacco: Never Used   Substance Use Topics    Alcohol use: Yes     Comment: mixed drinks 2-3        Review of Systems  Pertinent items are noted in HPI. Objective:     Visit Vitals  /77   Pulse (!) 55   Resp 16   Ht 5' 4\" (1.626 m)   Wt 163 lb (73.9 kg)   SpO2 98%   BMI 27.98 kg/m²     General:  alert, cooperative, no distress, appears stated age   Abdomen: soft, nontender, nondistended, no masses or organomegaly  no CVA tenderness. Back:  CVA tenderness absent   :  defer exam     Laboratory:   Urine dipstick shows negative for all components. Micro exam: not done. Assessment/Plan:     Possible UTI, will send out for culture prior to medicating this patient at this time     1. none  2. Maintain adequate hydration  3. May use OTC pyridium as desired, which will turn urine orange/red color  4. Follow up if symptoms not improving, and prn. ICD-10-CM ICD-9-CM    1. Dysuria R30.0 788.1 AMB POC URINALYSIS DIP STICK AUTO W/ MICRO      CULTURE, URINE      REFERRAL TO UROLOGY     reviewed medications and side effects in detail. Visit Vitals  /77   Pulse (!) 55   Resp 16   Ht 5' 4\" (1.626 m)   Wt 163 lb (73.9 kg)   SpO2 98%   BMI 27.98 kg/m²     Spoke with the patient regarding their blood pressure (BP) reading at today's visit. The patient verbalized understanding of need to maintain BP lower than 140/90. The patient will follow up with their primary care physician regarding management and/or medications that may be needed. Drepssion Screening has been completed. The patient isreports having a history of depression. The patient istaking medication and is being followed by their PCP at this time. This patient does  have a primary care physician. Referral was not given a referral at todays visit. Immunizations: The patient is current on their influenza immunization at this time.   The patient does not   want to receive the influenza immunization today. Follow up instructions given at today's visit were verbalized by the patient/parent. The signs of infection are fever > 100.4, increase fatigue, change in mental status, or decrease in urinary output. The patient/parent verbalized understanding of taking medications prescribed during this visit as prescribed.

## 2019-07-19 NOTE — REASON FOR VISIT
[Follow-Up: _____] : a [unfilled] follow-up visit [FreeTextEntry1] : s/p revision of bilateral breast reconstruction 7/2/19

## 2019-07-26 ENCOUNTER — OUTPATIENT (OUTPATIENT)
Dept: OUTPATIENT SERVICES | Facility: HOSPITAL | Age: 39
LOS: 1 days | Discharge: HOME | End: 2019-07-26

## 2019-07-26 DIAGNOSIS — C50.919 MALIGNANT NEOPLASM OF UNSPECIFIED SITE OF UNSPECIFIED FEMALE BREAST: ICD-10-CM

## 2019-07-26 DIAGNOSIS — C50.919 MALIGNANT NEOPLASM OF UNSPECIFIED SITE OF UNSPECIFIED FEMALE BREAST: Chronic | ICD-10-CM

## 2019-07-26 DIAGNOSIS — Z98.890 OTHER SPECIFIED POSTPROCEDURAL STATES: Chronic | ICD-10-CM

## 2019-07-26 DIAGNOSIS — Z98.2 PRESENCE OF CEREBROSPINAL FLUID DRAINAGE DEVICE: Chronic | ICD-10-CM

## 2019-07-31 ENCOUNTER — APPOINTMENT (OUTPATIENT)
Dept: HEMATOLOGY ONCOLOGY | Facility: CLINIC | Age: 39
End: 2019-07-31

## 2019-07-31 DIAGNOSIS — Z79.810 LONG TERM (CURRENT) USE OF SELECTIVE ESTROGEN RECEPTOR MODULATORS (SERMS): ICD-10-CM

## 2019-07-31 DIAGNOSIS — Z13.71 ENCOUNTER FOR NONPROCREATIVE SCREENING FOR GENETIC DISEASE CARRIER STATUS: ICD-10-CM

## 2019-07-31 DIAGNOSIS — C50.919 MALIGNANT NEOPLASM OF UNSPECIFIED SITE OF UNSPECIFIED FEMALE BREAST: ICD-10-CM
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
## 2019-07-31 NOTE — CONSULT LETTER
[Dear  ___] : Dear  [unfilled], [Consult Letter:] : I had the pleasure of evaluating your patient, [unfilled]. [Please see my note below.] : Please see my note below. [Consult Closing:] : Thank you very much for allowing me to participate in the care of this patient.  If you have any questions, please do not hesitate to contact me. [Sincerely,] : Sincerely, [DrSridhar  ___] : Dr. CAIN [FreeTextEntry3] : Farheen Pantoja MD\par Division of Medical Oncology and Hematology\par [facility] [place]\par [employer] [facility]\par [place], [place]

## 2019-07-31 NOTE — PHYSICAL EXAM
[Fully active, able to carry on all pre-disease performance without restriction] : Status 0 - Fully active, able to carry on all pre-disease performance without restriction [Normal] : affect appropriate [de-identified] : bl mastectomies with autologous reconstruction, healing well.

## 2019-07-31 NOTE — ASSESSMENT
[Curative] : Goals of care discussed with patient: Curative [FreeTextEntry1] : In summary, Ms. CHRISTOPHER JESSICA is a 38 year old postmenopausal female with stage IA (T1b, N0, M0) ER positive, HI positive, HER-2/karen negative invasive ductal carcinoma of the left breast. She is status post bilateral mastectomies and left sentinel ALN bx. Oncotype DX is 15. \par \par I discussed the treatment for stage I breast cancer with the patient including the role of chemotherapy, radiation therapy and endocrine therapy. Oncotype DX score is low and therefore chemotherapy will have no additional benefit over endocrine therapy alone (TAILORx). She is not a candidate for adjuvant RT either. She will benefit from endocrine therapy to prevent systemic recurrence. I recommend tamoxifen 20 mg x 10 years. \par \par I discussed the side effects of tamoxifen including but not limited to hot flashes, mood changes, fluid retention, vaginal dryness. cataracts, thromboembolic events, stroke, cardiovascular side effects and the risk of endometrial cancer. \par \par I reviewed that tamoxifen may stop her periods or can cause irregular bleeding. She is done with child bearing. She doesn’t wish to discuss fertility preserving options or meet with Dr Torres. I reviewed teratogenic effects of tamoxifen. She will continue to use non hormonal  barrier contraception to prevent pregnancy. No OCP use. She will followup with Dr Platt every 12 months and report any unusual vaginal bleeding or spotting. I recommended her to see an ophthalmologist annually. Patient is a nonsmoker and has no cardiovascular risk factors. She has no personal or family history of coagulation disorders.  She will continue to follow with her primary care physician for evaluation and management of risk factors for stroke. I educated about signs and symptoms of DVT/PE. Patient will report if she develops acute onset chest pain shortness of breath, leg swelling or calf pain. She understands the thrombotic risk and will hold tamoxifen prior to surgery or prolonged travel.\par \par After understanding the risks and benefits of tamoxifen, patient has decided to start tamoxifen. I will see her back in 3 months or sooner if she develops any side effects.\par \par She had BRCA 1/2 testing. We will call [facility] to add an extended panel.\par \par SHe has post mastectomy pain. She is getting home PT. She will see Dr Laws for post mastectomy  pain. Gabapentin was discussed but she will try it after completing home PT\par \par The patient had plenty of time to ask questions and all of her questions were answered to her satisfaction. I gave her my office phone number and encouraged her to call with any questions or additional information.

## 2019-08-22 ENCOUNTER — APPOINTMENT (OUTPATIENT)
Dept: PLASTIC SURGERY | Facility: CLINIC | Age: 39
End: 2019-08-22
Payer: COMMERCIAL

## 2019-08-22 DIAGNOSIS — Z42.1 ENCOUNTER FOR BREAST RECONSTRUCTION FOLLOWING MASTECTOMY: ICD-10-CM

## 2019-08-22 PROCEDURE — 99024 POSTOP FOLLOW-UP VISIT: CPT

## 2019-09-16 ENCOUNTER — OUTPATIENT (OUTPATIENT)
Dept: OUTPATIENT SERVICES | Facility: HOSPITAL | Age: 39
LOS: 1 days | End: 2019-09-16

## 2019-09-16 VITALS
DIASTOLIC BLOOD PRESSURE: 74 MMHG | RESPIRATION RATE: 16 BRPM | OXYGEN SATURATION: 99 % | WEIGHT: 199.96 LBS | SYSTOLIC BLOOD PRESSURE: 110 MMHG | TEMPERATURE: 99 F | HEART RATE: 88 BPM | HEIGHT: 61 IN

## 2019-09-16 DIAGNOSIS — C50.919 MALIGNANT NEOPLASM OF UNSPECIFIED SITE OF UNSPECIFIED FEMALE BREAST: Chronic | ICD-10-CM

## 2019-09-16 DIAGNOSIS — N65.1 DISPROPORTION OF RECONSTRUCTED BREAST: ICD-10-CM

## 2019-09-16 DIAGNOSIS — Z98.890 OTHER SPECIFIED POSTPROCEDURAL STATES: Chronic | ICD-10-CM

## 2019-09-16 DIAGNOSIS — Z98.2 PRESENCE OF CEREBROSPINAL FLUID DRAINAGE DEVICE: Chronic | ICD-10-CM

## 2019-09-16 DIAGNOSIS — Z85.3 PERSONAL HISTORY OF MALIGNANT NEOPLASM OF BREAST: ICD-10-CM

## 2019-09-16 DIAGNOSIS — C50.919 MALIGNANT NEOPLASM OF UNSPECIFIED SITE OF UNSPECIFIED FEMALE BREAST: ICD-10-CM

## 2019-09-16 LAB
ANION GAP SERPL CALC-SCNC: 11 MMO/L — SIGNIFICANT CHANGE UP (ref 7–14)
BUN SERPL-MCNC: 10 MG/DL — SIGNIFICANT CHANGE UP (ref 7–23)
CALCIUM SERPL-MCNC: 9.1 MG/DL — SIGNIFICANT CHANGE UP (ref 8.4–10.5)
CHLORIDE SERPL-SCNC: 104 MMOL/L — SIGNIFICANT CHANGE UP (ref 98–107)
CO2 SERPL-SCNC: 26 MMOL/L — SIGNIFICANT CHANGE UP (ref 22–31)
CREAT SERPL-MCNC: 0.81 MG/DL — SIGNIFICANT CHANGE UP (ref 0.5–1.3)
GLUCOSE SERPL-MCNC: 85 MG/DL — SIGNIFICANT CHANGE UP (ref 70–99)
HBA1C BLD-MCNC: 5.4 % — SIGNIFICANT CHANGE UP (ref 4–5.6)
HCG SERPL-ACNC: < 5 MIU/ML — SIGNIFICANT CHANGE UP
HCT VFR BLD CALC: 38.1 % — SIGNIFICANT CHANGE UP (ref 34.5–45)
HGB BLD-MCNC: 11.6 G/DL — SIGNIFICANT CHANGE UP (ref 11.5–15.5)
MCHC RBC-ENTMCNC: 28.2 PG — SIGNIFICANT CHANGE UP (ref 27–34)
MCHC RBC-ENTMCNC: 30.4 % — LOW (ref 32–36)
MCV RBC AUTO: 92.5 FL — SIGNIFICANT CHANGE UP (ref 80–100)
NRBC # FLD: 0 K/UL — SIGNIFICANT CHANGE UP (ref 0–0)
PLATELET # BLD AUTO: 204 K/UL — SIGNIFICANT CHANGE UP (ref 150–400)
PMV BLD: 13.2 FL — HIGH (ref 7–13)
POTASSIUM SERPL-MCNC: 3.9 MMOL/L — SIGNIFICANT CHANGE UP (ref 3.5–5.3)
POTASSIUM SERPL-SCNC: 3.9 MMOL/L — SIGNIFICANT CHANGE UP (ref 3.5–5.3)
RBC # BLD: 4.12 M/UL — SIGNIFICANT CHANGE UP (ref 3.8–5.2)
RBC # FLD: 13.5 % — SIGNIFICANT CHANGE UP (ref 10.3–14.5)
SODIUM SERPL-SCNC: 141 MMOL/L — SIGNIFICANT CHANGE UP (ref 135–145)
WBC # BLD: 5.64 K/UL — SIGNIFICANT CHANGE UP (ref 3.8–10.5)
WBC # FLD AUTO: 5.64 K/UL — SIGNIFICANT CHANGE UP (ref 3.8–10.5)

## 2019-09-16 RX ORDER — ASCORBIC ACID 60 MG
1 TABLET,CHEWABLE ORAL
Qty: 0 | Refills: 0 | DISCHARGE

## 2019-09-16 NOTE — H&P PST ADULT - NSANTHOSAYNRD_GEN_A_CORE
No. AMERICA screening performed.  STOP BANG Legend: 0-2 = LOW Risk; 3-4 = INTERMEDIATE Risk; 5-8 = HIGH Risk pt denies sx lonnie/No. LONNIE screening performed.  STOP BANG Legend: 0-2 = LOW Risk; 3-4 = INTERMEDIATE Risk; 5-8 = HIGH Risk

## 2019-09-16 NOTE — H&P PST ADULT - HISTORY OF PRESENT ILLNESS
This is a 40 y/o female who was diagnosed with left breast cancer with subsequent b/l mastectomies with reconstruction. S/P revision of b/l breast reconstruction and abdominal donor site  trunk liposuction with breast fat grafting on 7-2-19 This is a 40 y/o female who was diagnosed with left breast cancer with subsequent b/l mastectomies with reconstruction. S/P revision of b/l breast reconstruction and abdominal donor site  trunk liposuction with breast fat grafting on 7-2-19. pt now presents for Revision of Bilateral Breast Reconstruction , Bilateral Nipple Areola Reconstruction

## 2019-09-16 NOTE — H&P PST ADULT - NEGATIVE GENERAL SYMPTOMS
no chills/no sweating/no fever no weight gain/no chills/no sweating/no anorexia/no fever/no weight loss

## 2019-09-16 NOTE — H&P PST ADULT - NSANTHBPHIGHRD_ENT_A_CORE
Decreased laryngeal elevation/Delayed pharyngeal swallow No Delayed pharyngeal swallow/Decreased laryngeal elevation
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
## 2019-09-16 NOTE — H&P PST ADULT - NSICDXPROBLEM_GEN_ALL_CORE_FT
PROBLEM DIAGNOSES  Problem: History of malignant neoplasm of breast  Assessment and Plan: Revision of Bilateral Breast Reconstruction , Bilateral Nipple Areola Reconstruction  Pre op instructions including Hibiclens with teach back reviewed with pt pt verbalized good understanding of pre op instructions  Pt to DR Van for pre op medical evaluation  EKG done 9/19    Problem: Breast cancer  Assessment and Plan: No blood, bp, iv left arm

## 2019-09-16 NOTE — H&P PST ADULT - REASON FOR ADMISSION
they are revising the reconstruction and fixing my nipples "they are revising the reconstruction and fixing my nipples"

## 2019-09-16 NOTE — H&P PST ADULT - NS PRO LAST MENSTRUAL DATE
6-22-19 -- given urine cup to bring specimen the am of surgery 8/27/19-- given urine cup to bring specimen the am of surgery

## 2019-09-16 NOTE — H&P PST ADULT - ACTIVITY
can climb stairs without SOB can climb stairs without SOB; aquatics 2-3 x week 1 hour ; pt walks 1 mile 3-4 x week

## 2019-09-16 NOTE — H&P PST ADULT - NSICDXPASTSURGICALHX_GEN_ALL_CORE_FT
PAST SURGICAL HISTORY:  Breast cancer b/l mastectomies with reconstruction in 2019    H/O induced  x 1    H/O left breast biopsy     History of brain shunt 1990 PAST SURGICAL HISTORY:  Breast cancer b/l mastectomies with reconstruction; ALANA flap  in 2019    H/O induced  x 1    H/O left breast biopsy     History of brain shunt 1990    S/P breast reconstruction, bilateral Revision 19

## 2019-09-26 ENCOUNTER — RX RENEWAL (OUTPATIENT)
Age: 39
End: 2019-09-26

## 2019-09-26 RX ORDER — OXYCODONE AND ACETAMINOPHEN 5; 325 MG/1; MG/1
5-325 TABLET ORAL
Qty: 15 | Refills: 0 | Status: ACTIVE | COMMUNITY
Start: 2019-09-26 | End: 1900-01-01

## 2019-09-30 ENCOUNTER — TRANSCRIPTION ENCOUNTER (OUTPATIENT)
Age: 39
End: 2019-09-30

## 2019-10-01 ENCOUNTER — OUTPATIENT (OUTPATIENT)
Dept: OUTPATIENT SERVICES | Facility: HOSPITAL | Age: 39
LOS: 1 days | Discharge: HOME | End: 2019-10-01
Payer: COMMERCIAL

## 2019-10-01 VITALS
TEMPERATURE: 98 F | RESPIRATION RATE: 16 BRPM | OXYGEN SATURATION: 100 % | SYSTOLIC BLOOD PRESSURE: 125 MMHG | HEART RATE: 59 BPM | DIASTOLIC BLOOD PRESSURE: 58 MMHG

## 2019-10-01 VITALS
OXYGEN SATURATION: 100 % | RESPIRATION RATE: 20 BRPM | TEMPERATURE: 98 F | HEIGHT: 61 IN | HEART RATE: 67 BPM | WEIGHT: 199.96 LBS | SYSTOLIC BLOOD PRESSURE: 130 MMHG | DIASTOLIC BLOOD PRESSURE: 48 MMHG

## 2019-10-01 DIAGNOSIS — N65.1 DISPROPORTION OF RECONSTRUCTED BREAST: ICD-10-CM

## 2019-10-01 DIAGNOSIS — Z98.890 OTHER SPECIFIED POSTPROCEDURAL STATES: Chronic | ICD-10-CM

## 2019-10-01 DIAGNOSIS — C50.919 MALIGNANT NEOPLASM OF UNSPECIFIED SITE OF UNSPECIFIED FEMALE BREAST: Chronic | ICD-10-CM

## 2019-10-01 DIAGNOSIS — Z98.2 PRESENCE OF CEREBROSPINAL FLUID DRAINAGE DEVICE: Chronic | ICD-10-CM

## 2019-10-01 LAB — GLUCOSE BLDC GLUCOMTR-MCNC: 93 MG/DL — SIGNIFICANT CHANGE UP (ref 70–99)

## 2019-10-01 PROCEDURE — 19380 REVJ RECONSTRUCTED BREAST: CPT | Mod: RT

## 2019-10-01 PROCEDURE — 19350 NIPPLE/AREOLA RECONSTRUCTION: CPT | Mod: LT

## 2019-10-01 PROCEDURE — 15200 FTH/GFT FR TRNK 20 SQ CM/<: CPT

## 2019-10-01 RX ORDER — OXYCODONE HYDROCHLORIDE 5 MG/1
5 TABLET ORAL ONCE
Refills: 0 | Status: DISCONTINUED | OUTPATIENT
Start: 2019-10-01 | End: 2019-10-01

## 2019-10-01 NOTE — BRIEF OPERATIVE NOTE - OPERATION/FINDINGS
Revision of bilateral breast reconstruction, bilateral nipple reconstruction with CV flap, bilateral areola reconstruction with FTSG

## 2019-10-01 NOTE — ASU DISCHARGE PLAN (ADULT/PEDIATRIC) - PATIENT BELONGINGS
Chemotherapy Administration    Pre-assessment Data: Antineoplastic Agents  Other:   See toxicity flow sheet for assessment [x]     Physician Notification of Concerns Related to Chemotherapy Administration:   Physician Notified Elise Brody / Time of Notification      Interventions:   Lab work assessed  []   Height / Weight verified for dose [x]   Current MAR reviewed [x]   Emergency drugs available as appropriate [x]   Anaphylaxis assessment completed [x]   Pre-medications administered as ordered [x]   Blood return noted upon initiation of chemotherapy [x]   Blood return noted each 1-2ml of a vesicant medication if given IV push []   Blood return noted each 2-3ml of a non-vesicant medication if given IV push []   Monitor for signs / symptoms of hypersensitivity reaction [x]   Chemotherapy orders (drug/dose/rate) verified by 2 Chemo certified RNs [x]   Monitor IV site and blood return throughout the infusion of the medication [x]   Document IV site checks on the IV assessment form [x]   Document chemotherapy teaching on the Patient Education tab [x]   Document patient verbalizes understanding of medications being administered [x]   If IV infiltration, see ONS Guidelines []   Other:      []
Patient's belongings returned

## 2019-10-01 NOTE — ASU DISCHARGE PLAN (ADULT/PEDIATRIC) - CARE PROVIDER_API CALL
Froylan Can; NELLY)  Otolaryngology; Plastic Surgery  [address] [place] [address] [address]  [place] [address]  Phone: [phone]  Fax: [phone]  Follow Up Time:

## 2019-10-02 PROBLEM — E11.9 TYPE 2 DIABETES MELLITUS WITHOUT COMPLICATIONS: Chronic | Status: ACTIVE | Noted: 2019-09-16

## 2019-10-07 ENCOUNTER — APPOINTMENT (OUTPATIENT)
Dept: PLASTIC SURGERY | Facility: CLINIC | Age: 39
End: 2019-10-07
Payer: COMMERCIAL

## 2019-10-07 PROCEDURE — 99024 POSTOP FOLLOW-UP VISIT: CPT

## 2019-10-07 NOTE — REASON FOR VISIT
[Follow-Up: _____] : a [unfilled] follow-up visit [FreeTextEntry1] : s/p revision of bilateral breast reconstruction with bilateral nipple and areola reconstruction 10/1/19

## 2019-10-16 ENCOUNTER — APPOINTMENT (OUTPATIENT)
Dept: PLASTIC SURGERY | Facility: CLINIC | Age: 39
End: 2019-10-16
Payer: COMMERCIAL

## 2019-10-16 PROCEDURE — 99024 POSTOP FOLLOW-UP VISIT: CPT

## 2019-10-17 NOTE — REASON FOR VISIT
[Follow-Up: _____] : a [unfilled] follow-up visit [FreeTextEntry1] : s/p revision of bilateral breast reconstruction and nipple/areola reconstruction 10/1/19

## 2019-10-23 ENCOUNTER — APPOINTMENT (OUTPATIENT)
Dept: PLASTIC SURGERY | Facility: CLINIC | Age: 39
End: 2019-10-23
Payer: COMMERCIAL

## 2019-10-23 PROCEDURE — 99024 POSTOP FOLLOW-UP VISIT: CPT

## 2019-11-20 ENCOUNTER — APPOINTMENT (OUTPATIENT)
Dept: PLASTIC SURGERY | Facility: CLINIC | Age: 39
End: 2019-11-20
Payer: COMMERCIAL

## 2019-11-20 PROCEDURE — 99024 POSTOP FOLLOW-UP VISIT: CPT

## 2020-01-31 ENCOUNTER — APPOINTMENT (OUTPATIENT)
Dept: PLASTIC SURGERY | Facility: CLINIC | Age: 40
End: 2020-01-31
Payer: COMMERCIAL

## 2020-01-31 PROCEDURE — 19350 NIPPLE/AREOLA RECONSTRUCTION: CPT | Mod: LT

## 2020-01-31 PROCEDURE — 19380 REVJ RECONSTRUCTED BREAST: CPT | Mod: LT

## 2020-02-02 NOTE — REASON FOR VISIT
[Procedure: _________] : a [unfilled] procedure visit [FreeTextEntry1] : bilateral nipple/areola micropigmentation tattoo

## 2020-02-02 NOTE — PROCEDURE
[Nl] : None [FreeTextEntry1] : absence of bilateral breasts and nipples, s/p bilateral breast reconstruction [FreeTextEntry2] : bilateral nipple/areola micropigmentation tattoo [FreeTextEntry3] : topical [FreeTextEntry4] : none [FreeTextEntry5] : none [FreeTextEntry6] : Bilateral breasts cleaned and prepped.  Nipple and areola micropigmentation performed with multiple passes of 5 slope needle with colors 9 and black noir (2:1).  Areola micropigmentation performed according to surgically outlined area bilaterally.  Good result after tattoo.  Ointment and Telfa applied.

## 2020-02-12 ENCOUNTER — APPOINTMENT (OUTPATIENT)
Dept: PLASTIC SURGERY | Facility: CLINIC | Age: 40
End: 2020-02-12
Payer: COMMERCIAL

## 2020-02-12 PROCEDURE — 99024 POSTOP FOLLOW-UP VISIT: CPT

## 2020-02-13 NOTE — REASON FOR VISIT
[Follow-Up: _____] : a [unfilled] follow-up visit [FreeTextEntry1] : s/p revision of left breast reconstruction and bilateral nipple/areola micropigmentation tattoo 1/31/20

## 2020-09-08 ENCOUNTER — APPOINTMENT (OUTPATIENT)
Dept: PLASTIC SURGERY | Facility: CLINIC | Age: 40
End: 2020-09-08

## 2020-09-11 ENCOUNTER — APPOINTMENT (OUTPATIENT)
Dept: PLASTIC SURGERY | Facility: CLINIC | Age: 40
End: 2020-09-11
Payer: COMMERCIAL

## 2020-09-11 VITALS
HEART RATE: 89 BPM | DIASTOLIC BLOOD PRESSURE: 55 MMHG | BODY MASS INDEX: 37.69 KG/M2 | HEIGHT: 60 IN | WEIGHT: 192 LBS | SYSTOLIC BLOOD PRESSURE: 92 MMHG | TEMPERATURE: 98.4 F | OXYGEN SATURATION: 97 %

## 2020-09-11 DIAGNOSIS — Z90.13 ACQUIRED ABSENCE OF BILATERAL BREASTS AND NIPPLES: ICD-10-CM

## 2020-09-11 PROCEDURE — 11920 CORRECT SKIN COLOR 6.0 CM/<: CPT

## 2020-11-12 NOTE — H&P PST ADULT - NSICDXFAMILYHX_GEN_ALL_CORE_FT
Subjective:       Patient ID: Bonnie Livingston is a 63 y.o. female.    Chief Complaint: Colon Cancer    DIAGNOSIS: T3 N0 M0 Stage IIA sigmoid colon adenocarcinoma.     DATE OF DIAGNOSIS: 10/14/2011    HPI She was found to have a polypoid nonobstructing large mass in the sigmoid colon on a routine screening colonoscopy and underwent a sigmoid colon resection for this purpose on 10/24/2011. Pathology revealed an invasive moderately differentiated adenocarcinoma with a maximum tumor size of 2.5 cm with the tumor invading through the muscularis propria into subserosa. The margins are free. Seven lymph nodes were removed without any evidence of malignancy in any of the lymph nodes removed. The tumor was a low-grade moderately differentiated histologic grade. No adjuvant chemotherapy was administered.    She is here for a followup today. She feels well.     Last colonoscopy was done in Feb 2020 - , repeat suggested in 5 years    She is taking iron pills, one pill every now and then.    Review of Systems    CONSTITUTIONAL: No weight loss. No fevers.  HEME/LYMPH: No lymph node enlargements or bruises  CARDIOVASCULAR: No chest pain or palpitations.  RESPIRATORY: Positive cough and congestion.  No hemoptysis.    GASTROINTESTINAL: No abdominal pain, nausea or change in bowel habits.  All other organ systems reviewed and are negative.    Objective:      Physical Exam    GENERAL: Well-groomed, moderately-built. Vitals noted.  ENT&MOUTH: Oral mucosa moist. No thrush, gum bleeding or oral masses noted.  NECK: Supple without any masses. Thyroid is non-tender.  LYMPH NODES: None felt in the neck or supraclavicular areas.  LUNGS: Clear bilaterally without crackles or wheeze. Breathing nonlabored.  HEART: S1, S2 heard. Rhythm regular. No tachycardia or gallops. No pedal edema.   ABDOMEN: Soft and non-tender. No palpable masses, hepatosplenomegaly or ascites.    Assessment:       1. Malignant neoplasm of sigmoid colon    2. Iron  deficiency anemia secondary to inadequate dietary iron intake         Plan:   She is doing well from a standpoint of colon cancer.    She doesn't need oral iron.  She wants to continue to take it.  I think it's okay to continue every now and then like she wants to.  Next colonoscopy due Feb 2025  I will see her back for follow-up in 1 year with CBC, CMP and iron studies.  No need surveillance scans unless clinically indicated.        .      No pertinent family history in first degree relatives FAMILY HISTORY:  No pertinent family history in first degree relatives

## 2021-09-22 ENCOUNTER — RESULT REVIEW (OUTPATIENT)
Age: 41
End: 2021-09-22

## 2021-10-15 ENCOUNTER — RESULT REVIEW (OUTPATIENT)
Age: 41
End: 2021-10-15

## 2021-11-04 NOTE — H&P PST ADULT - ASSESSMENT
Does [facility] have any openings left for tomorrow?   Personal History of Malignancy of Breast

## 2022-11-23 NOTE — ASU PREOP CHECKLIST - TEMPERATURE IN CELSIUS (DEGREES C)
-- DO NOT REPLY / DO NOT REPLY ALL --  -- Message is from Engagement Center Operations (ECO) --    ONLY TO BE USED WITHIN A REFILL MEDICATION ENCOUNTER    Med Refill  Is the patient currently having any symptoms?: No/Non-Emergent symptoms    Name of medication requested: See pended med    Has patient contacted the pharmacy? Yes    Is this the first request for the medication in the last 48 hours?: Yes      Patient is requesting a medication refill - medication is on active list      Full name of the provider who ordered the medication: Dr. Mika Murguia     [facility] site name / Account # for provider: [facility] - [address] [place] [address] ([facility])       Preferred Pharmacy: Pharmacy  [facility]Pharm[facility] #[id number] [place], [place] - [address]    Patient confirmed the above pharmacy as correct?  Yes      Caller Information       Type Contact Phone/Fax    11/23/2022 03:15 PM CST Phone (Incoming) HuaJong swanson (Self) [phone] (M)          Alternative phone number: No    Can a detailed message be left?: Yes    Patient is completely out of medication: Verify if patient is currently experiencing symptoms. If patient is symptomatic, proceed with front end triage instead of medication refill. If patient is not symptomatic but is completely out of medication, brandy as High priority when routing. Inform patient: “Please call back with any questions or concerns and if your condition becomes life threatening, you should seek immediate medical assistance by calling 911 or going to the Emergency Department for evaluation.”    Inform all patients: \"If the clinical team needs to contact you regarding this refill, please be aware the return phone call may come from an unidentified or out of state phone number and your refill request will be addressed as soon as the clinical team reviews your message.\"   36.8

## 2023-06-08 NOTE — H&P PST ADULT - TOBACCO USE
impaired balance/impaired coordination/impaired motor control/impaired postural control Never smoker

## 2023-06-19 NOTE — ASU PREOPERATIVE ASSESSMENT, ADULT ([facility] ONLY) - TRANSPORT TO OR VIA
Impression: Myopia, bilateral: H52.13. Plan: Order refraction to determine patient's best corrected visual acuity. Issued new glasses prescription today.  Return in 1 year for vision exam.
ambulatory

## 2023-08-24 NOTE — ASU PATIENT PROFILE, ADULT - PSH
Imiquimod Pregnancy And Lactation Text: This medication is Pregnancy Category C. It is unknown if this medication is excreted in breast milk. H/O left breast biopsy    History of brain shunt  08/1990

## 2023-08-28 NOTE — PATIENT PROFILE ADULT - BRADEN M[facility]
Continuity of Care Form    Patient Name: Kylee Choi   :    MRN:  [id number]    Admit date:  2023  Discharge date:  2023    Code Status Order: Full Code   Advance Directives:     Admitting Physician:  Yu Disla DO  PCP: Bard Genevieve DO    Discharging Nurse: Dorthula Bamberger, BSN, [address] Unit/Room#: 5446/2386-66  Discharging Unit Phone Number: [phone]    Emergency Contact:   Extended Emergency Contact Information  Primary Emergency Contact: Gabe Watson  Address: [address] [place] [address], [address] [facility] of [address] [address] Phone: [phone]  Mobile Phone: [phone]  Relation: Spouse   needed? No  Secondary Emergency Contact: [facility] of [address] [address] Phone: [phone]  Mobile Phone: [phone]  Relation: Child   needed?  No    Past Surgical History:  Past Surgical History:   Procedure Laterality Date    COLON SURGERY      COLONOSCOPY      INCISIONAL HERNIA REPAIR N/A 10/13/2016    INGUINAL HERNIA REPAIR Left 2013    laparoscopic left inguinal hernia repair    NERVE BLOCK Left 2021    LEFT S1 TRANSFORAMINAL EPIDURAL STEROID INJECTION(REQUESTS LAST APPOINMENT) performed by Carly Teixeiar DO at [address] Left 2021    LEFT L5-S1 TRANSFORAMINAL EPIDURAL STEROID INJECTION(WANTS LATER) performed by Carly Teixeira DO at [address] [place] [address]      Whipple procedure    WI LAPS COLECTOMY ABDL W/PROCTECTOMY W/ILEOSTOMY N/A 2018    DIAGNOSTIC LAPAROSCOPY POSS LAPAROTOMY POSS BOWEL RESECTION performed by Clemencia Cuevas MD at [address] 1 SMALL INTEST [address] [place] [address] N/A 2018    LAPAROSCOPIC REVISION OF GASTROJEJUNOSTOMY; INTRAOPERATIVE EGD performed by Clemencia Cuevas MD at [address] Right 2019    RIGHT REVERSE TOTAL SHOULDER ARTHROPLASTY performed by Guillermo Francois (4) no limitation

## 2023-11-09 NOTE — H&P PST ADULT - MEDICATION HERBAL TYPE, PROFILE
-please see below    Would you like the lab recollected?  
[facility] lab calling, patient had labs drawn 11/8/23 for NT proBNP. Lab order was cancelled, \"specimen was not spun\".   
I do not believe I ordered that test.  It appears Cardiology ordered it.  
Noted,   Mara did address labs, spoke with pt today, feels good.  Discussing POC w/ [facility] Friday (tomorrow)  Labs entered for Dec. Pt notified  
Patient is currently seeing Mara Neves for cardiology     Will route to their office as SANDY   
herbal

## 2024-03-29 ENCOUNTER — NON-APPOINTMENT (OUTPATIENT)
Age: 44
End: 2024-03-29

## 2025-02-07 ENCOUNTER — EMERGENCY (EMERGENCY)
Facility: HOSPITAL | Age: 45
LOS: 0 days | Discharge: HOME | End: 2025-02-08
Attending: STUDENT IN AN ORGANIZED HEALTH CARE EDUCATION/TRAINING PROGRAM
Payer: COMMERCIAL

## 2025-02-07 VITALS
HEIGHT: 61 IN | WEIGHT: 167.99 LBS | SYSTOLIC BLOOD PRESSURE: 113 MMHG | OXYGEN SATURATION: 100 % | HEART RATE: 58 BPM | DIASTOLIC BLOOD PRESSURE: 57 MMHG | RESPIRATION RATE: 16 BRPM | TEMPERATURE: 98 F

## 2025-02-07 DIAGNOSIS — Z98.2 PRESENCE OF CEREBROSPINAL FLUID DRAINAGE DEVICE: Chronic | ICD-10-CM

## 2025-02-07 DIAGNOSIS — R10.13 EPIGASTRIC PAIN: ICD-10-CM

## 2025-02-07 DIAGNOSIS — Z90.13 ACQUIRED ABSENCE OF BILATERAL BREASTS AND NIPPLES: ICD-10-CM

## 2025-02-07 DIAGNOSIS — R11.0 NAUSEA: ICD-10-CM

## 2025-02-07 DIAGNOSIS — Z85.3 PERSONAL HISTORY OF MALIGNANT NEOPLASM OF BREAST: ICD-10-CM

## 2025-02-07 DIAGNOSIS — Z98.890 OTHER SPECIFIED POSTPROCEDURAL STATES: Chronic | ICD-10-CM

## 2025-02-07 DIAGNOSIS — C50.919 MALIGNANT NEOPLASM OF UNSPECIFIED SITE OF UNSPECIFIED FEMALE BREAST: Chronic | ICD-10-CM

## 2025-02-07 DIAGNOSIS — R10.11 RIGHT UPPER QUADRANT PAIN: ICD-10-CM

## 2025-02-07 LAB
ALBUMIN SERPL ELPH-MCNC: 3.4 G/DL — SIGNIFICANT CHANGE UP (ref 3.3–5)
ALP SERPL-CCNC: 61 U/L — SIGNIFICANT CHANGE UP (ref 40–120)
ALT FLD-CCNC: 17 U/L — SIGNIFICANT CHANGE UP (ref 12–78)
ANION GAP SERPL CALC-SCNC: 4 MMOL/L — LOW (ref 5–17)
AST SERPL-CCNC: 19 U/L — SIGNIFICANT CHANGE UP (ref 15–37)
BASOPHILS # BLD AUTO: 0.05 K/UL — SIGNIFICANT CHANGE UP (ref 0–0.2)
BASOPHILS NFR BLD AUTO: 1 % — SIGNIFICANT CHANGE UP (ref 0–2)
BILIRUB SERPL-MCNC: 0.2 MG/DL — SIGNIFICANT CHANGE UP (ref 0.2–1.2)
BUN SERPL-MCNC: 12 MG/DL — SIGNIFICANT CHANGE UP (ref 7–23)
CALCIUM SERPL-MCNC: 8.3 MG/DL — LOW (ref 8.5–10.1)
CHLORIDE SERPL-SCNC: 108 MMOL/L — SIGNIFICANT CHANGE UP (ref 96–108)
CO2 SERPL-SCNC: 27 MMOL/L — SIGNIFICANT CHANGE UP (ref 22–31)
CREAT SERPL-MCNC: 0.77 MG/DL — SIGNIFICANT CHANGE UP (ref 0.5–1.3)
EGFR: 97 ML/MIN/1.73M2 — SIGNIFICANT CHANGE UP
EOSINOPHIL # BLD AUTO: 0.16 K/UL — SIGNIFICANT CHANGE UP (ref 0–0.5)
EOSINOPHIL NFR BLD AUTO: 3.1 % — SIGNIFICANT CHANGE UP (ref 0–6)
GLUCOSE SERPL-MCNC: 97 MG/DL — SIGNIFICANT CHANGE UP (ref 70–99)
HCG SERPL-ACNC: 4 MIU/ML — SIGNIFICANT CHANGE UP
HCT VFR BLD CALC: 31.1 % — LOW (ref 34.5–45)
HGB BLD-MCNC: 9.4 G/DL — LOW (ref 11.5–15.5)
IMM GRANULOCYTES NFR BLD AUTO: 0.2 % — SIGNIFICANT CHANGE UP (ref 0–0.9)
LIDOCAIN IGE QN: 29 U/L — SIGNIFICANT CHANGE UP (ref 13–75)
LYMPHOCYTES # BLD AUTO: 2.48 K/UL — SIGNIFICANT CHANGE UP (ref 1–3.3)
LYMPHOCYTES # BLD AUTO: 48.3 % — HIGH (ref 13–44)
MCHC RBC-ENTMCNC: 25.6 PG — LOW (ref 27–34)
MCHC RBC-ENTMCNC: 30.2 G/DL — LOW (ref 32–36)
MCV RBC AUTO: 84.7 FL — SIGNIFICANT CHANGE UP (ref 80–100)
MONOCYTES # BLD AUTO: 0.5 K/UL — SIGNIFICANT CHANGE UP (ref 0–0.9)
MONOCYTES NFR BLD AUTO: 9.7 % — SIGNIFICANT CHANGE UP (ref 2–14)
NEUTROPHILS # BLD AUTO: 1.93 K/UL — SIGNIFICANT CHANGE UP (ref 1.8–7.4)
NEUTROPHILS NFR BLD AUTO: 37.7 % — LOW (ref 43–77)
NRBC # BLD: 0 /100 WBCS — SIGNIFICANT CHANGE UP (ref 0–0)
NRBC BLD-RTO: 0 /100 WBCS — SIGNIFICANT CHANGE UP (ref 0–0)
PLATELET # BLD AUTO: 220 K/UL — SIGNIFICANT CHANGE UP (ref 150–400)
POTASSIUM SERPL-MCNC: 4.2 MMOL/L — SIGNIFICANT CHANGE UP (ref 3.5–5.3)
POTASSIUM SERPL-SCNC: 4.2 MMOL/L — SIGNIFICANT CHANGE UP (ref 3.5–5.3)
PROT SERPL-MCNC: 7.3 GM/DL — SIGNIFICANT CHANGE UP (ref 6–8.3)
RBC # BLD: 3.67 M/UL — LOW (ref 3.8–5.2)
RBC # FLD: 15 % — HIGH (ref 10.3–14.5)
SODIUM SERPL-SCNC: 139 MMOL/L — SIGNIFICANT CHANGE UP (ref 135–145)
TROPONIN I, HIGH SENSITIVITY RESULT: 3.4 NG/L — SIGNIFICANT CHANGE UP
WBC # BLD: 5.13 K/UL — SIGNIFICANT CHANGE UP (ref 3.8–10.5)
WBC # FLD AUTO: 5.13 K/UL — SIGNIFICANT CHANGE UP (ref 3.8–10.5)

## 2025-02-07 PROCEDURE — 93010 ELECTROCARDIOGRAM REPORT: CPT

## 2025-02-07 PROCEDURE — 76705 ECHO EXAM OF ABDOMEN: CPT | Mod: 26

## 2025-02-07 PROCEDURE — 99284 EMERGENCY DEPT VISIT MOD MDM: CPT

## 2025-02-07 NOTE — ED PROVIDER NOTE - CLINICAL SUMMARY MEDICAL DECISION MAKING FREE TEXT BOX
44-year-old female with a past medical history of breast cancer status post bilateral mastectomy in 2019 presenting with abdominal pain. Patient states pain is located in the epigastric region. Described as sharp pain that last for approximately 30 seconds at a time and recurs every 1 to 2 minutes. Patient was at the movies and the symptoms began. Pain did not wake patient up from sleeping. Pain resumed this morning when the patient woke up. Patient was seen in urgent care and sent to the emergency department for an evaluation. Associated nausea. Denies fevers, chest pain, difficulty breathing, vomiting, dysuria, recent travel. Physical exam reveals well-appearing female, heart rate regular, clear breath sounds bilaterally, mild right upper quadrant abdominal tenderness, no CVA tenderness, no lower extremity edema. Will evaluate for biliary pathology versus ACS vs pancreatitis. CBC, CMP, troponin, lipase, right upper quadrant ultrasound. Patient declining pain and nausea medication at this time.

## 2025-02-07 NOTE — ED PROVIDER NOTE - NSFOLLOWUPINSTRUCTIONS_ED_ALL_ED_FT
Please follow up with your primary care physician within the next 4-6 days.    You may take Tylenol (1000mg) every 6 hours for pain control.     Please return to the emergency department if you experience any of the following symptoms:    Fever  Chest pain  Difficulty breathing  Abdominal pain  Nausea  Vomiting

## 2025-02-07 NOTE — ED PROVIDER NOTE - NSICDXPASTSURGICALHX_GEN_ALL_CORE_FT
PAST SURGICAL HISTORY:  Breast cancer b/l mastectomies with reconstruction; ALANA flap  in 2019    H/O induced  x 1    H/O left breast biopsy     History of brain shunt 1990    S/P breast reconstruction, bilateral Revision 19

## 2025-02-07 NOTE — ED PROVIDER NOTE - PROGRESS NOTE DETAILS
Arthur DO: Patient reassessed at bedside and continues to appear well. Ultrasound shows no acute pathology. Labs are nonactionable at this time. Discussed with patient. Patient instructed to follow-up with PCP.

## 2025-02-07 NOTE — ED PROVIDER NOTE - PATIENT PORTAL LINK FT
You can access the FollowMyHealth Patient Portal offered by [facility] by registering at the following website: [email or web address]. By joining [facility]’s FollowMyHealth portal, you will also be able to view your health information using other applications (apps) compatible with our system.

## 2025-02-07 NOTE — ED ADULT NURSE NOTE - OBJECTIVE STATEMENT
Pt is a 44y M AOx4 with a pmh of breast cancer s/p bilateral mastectomy in 2019. Pt reports intermittent epigastric pain with nausea. Pt states the pain feels like a pinch that last 30 seconds.  Pt denies n/v/d, cp, headache or dysuria.

## 2025-02-07 NOTE — ED ADULT NURSE NOTE - NSFALLUNIVINTERV_ED_ALL_ED
Bed/Stretcher in lowest position, wheels locked, appropriate side rails in place/Call bell, personal items and telephone in reach/Instruct patient to call for assistance before getting out of bed/chair/stretcher/Non-slip footwear applied when patient is off stretcher/[place] to call system/Physically safe environment - no spills, clutter or unnecessary equipment/Purposeful proactive rounding/Room/bathroom lighting operational, light cord in reach

## 2025-02-07 NOTE — ED ADULT TRIAGE NOTE - CHIEF COMPLAINT QUOTE
complaining of abdominal pain, epigastric pain started last night, h/o Breast CA bilateral mastectomy 2019

## 2025-02-07 NOTE — ED PROVIDER NOTE - PHYSICAL EXAMINATION
General: Appears well and nontoxic  Mentation: A&O x 3  psych: mood appropriate  HEENT: airway patent, conjunctivae clear bilaterally  Resp: symmetric chest rise, no resp distress, breath sounds CTA bilaterally  Cardio: RRR, no m/r/g  GI: soft/nondistended, mild RUQ abdominal tenderness  : no CVA tenderness  Neuro: sensation and motor function grossly intact  Skin: no cyanosis, no jaundice  MSK: normal movement of all extremities  Lymph/Vasc: no FELICITA edema
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
## 2025-02-08 VITALS
RESPIRATION RATE: 15 BRPM | OXYGEN SATURATION: 100 % | TEMPERATURE: 98 F | DIASTOLIC BLOOD PRESSURE: 76 MMHG | HEART RATE: 70 BPM | SYSTOLIC BLOOD PRESSURE: 121 MMHG